# Patient Record
Sex: FEMALE | Race: WHITE | HISPANIC OR LATINO | Employment: PART TIME
[De-identification: names, ages, dates, MRNs, and addresses within clinical notes are randomized per-mention and may not be internally consistent; named-entity substitution may affect disease eponyms.]

---

## 2017-06-03 ENCOUNTER — HOSPITAL (OUTPATIENT)
Dept: OTHER | Age: 22
End: 2017-06-03
Attending: EMERGENCY MEDICINE

## 2019-02-01 ENCOUNTER — HOSPITAL (OUTPATIENT)
Dept: OTHER | Age: 24
End: 2019-02-01
Attending: EMERGENCY MEDICINE

## 2019-02-01 ENCOUNTER — DIAGNOSTIC TRANS (OUTPATIENT)
Dept: OTHER | Age: 24
End: 2019-02-01

## 2019-04-23 ENCOUNTER — HOSPITAL (OUTPATIENT)
Dept: OTHER | Age: 24
End: 2019-04-23
Attending: GENERAL PRACTICE

## 2019-05-07 ENCOUNTER — HOSPITAL (OUTPATIENT)
Dept: OTHER | Age: 24
End: 2019-05-07
Attending: GENERAL PRACTICE

## 2019-05-27 ENCOUNTER — HOSPITAL (OUTPATIENT)
Dept: OTHER | Age: 24
End: 2019-05-27

## 2019-05-27 PROCEDURE — 99283 EMERGENCY DEPT VISIT LOW MDM: CPT | Performed by: EMERGENCY MEDICINE

## 2019-08-15 ENCOUNTER — APPOINTMENT (OUTPATIENT)
Dept: CT IMAGING | Facility: HOSPITAL | Age: 24
End: 2019-08-15
Attending: EMERGENCY MEDICINE
Payer: MEDICAID

## 2019-08-15 ENCOUNTER — HOSPITAL ENCOUNTER (EMERGENCY)
Facility: HOSPITAL | Age: 24
Discharge: HOME | End: 2019-08-15
Attending: EMERGENCY MEDICINE
Payer: MEDICAID

## 2019-08-15 VITALS
TEMPERATURE: 98 F | BODY MASS INDEX: 19.29 KG/M2 | SYSTOLIC BLOOD PRESSURE: 99 MMHG | DIASTOLIC BLOOD PRESSURE: 65 MMHG | HEIGHT: 66 IN | HEART RATE: 56 BPM | WEIGHT: 120 LBS | RESPIRATION RATE: 20 BRPM | OXYGEN SATURATION: 99 %

## 2019-08-15 DIAGNOSIS — R10.9 ABDOMINAL PAIN, ACUTE: Primary | ICD-10-CM

## 2019-08-15 DIAGNOSIS — R11.2 NON-INTRACTABLE VOMITING WITH NAUSEA, UNSPECIFIED VOMITING TYPE: ICD-10-CM

## 2019-08-15 LAB
ANION GAP SERPL CALC-SCNC: 10 MMOL/L (ref 0–18)
B-HCG UR QL: NEGATIVE
BASOPHILS # BLD AUTO: 0.02 X10(3) UL (ref 0–0.2)
BASOPHILS NFR BLD AUTO: 0.2 %
BILIRUB UR QL: NEGATIVE
BUN BLD-MCNC: 17 MG/DL (ref 7–18)
BUN/CREAT SERPL: 22.7 (ref 10–20)
CALCIUM BLD-MCNC: 8.5 MG/DL (ref 8.5–10.1)
CHLORIDE SERPL-SCNC: 109 MMOL/L (ref 98–112)
CLARITY UR: CLEAR
CO2 SERPL-SCNC: 22 MMOL/L (ref 21–32)
COLOR UR: YELLOW
CREAT BLD-MCNC: 0.75 MG/DL (ref 0.55–1.02)
DEPRECATED RDW RBC AUTO: 41.4 FL (ref 35.1–46.3)
EOSINOPHIL # BLD AUTO: 0.08 X10(3) UL (ref 0–0.7)
EOSINOPHIL NFR BLD AUTO: 0.9 %
ERYTHROCYTE [DISTWIDTH] IN BLOOD BY AUTOMATED COUNT: 12.6 % (ref 11–15)
GLUCOSE BLD-MCNC: 87 MG/DL (ref 70–99)
GLUCOSE UR-MCNC: NEGATIVE MG/DL
HCT VFR BLD AUTO: 40.3 % (ref 35–48)
HGB BLD-MCNC: 13.4 G/DL (ref 12–16)
HGB UR QL STRIP.AUTO: NEGATIVE
IMM GRANULOCYTES # BLD AUTO: 0.02 X10(3) UL (ref 0–1)
IMM GRANULOCYTES NFR BLD: 0.2 %
KETONES UR-MCNC: NEGATIVE MG/DL
LYMPHOCYTES # BLD AUTO: 1.42 X10(3) UL (ref 1–4)
LYMPHOCYTES NFR BLD AUTO: 16.3 %
MCH RBC QN AUTO: 29.7 PG (ref 26–34)
MCHC RBC AUTO-ENTMCNC: 33.3 G/DL (ref 31–37)
MCV RBC AUTO: 89.4 FL (ref 80–100)
MONOCYTES # BLD AUTO: 0.64 X10(3) UL (ref 0.1–1)
MONOCYTES NFR BLD AUTO: 7.3 %
NEUTROPHILS # BLD AUTO: 6.53 X10 (3) UL (ref 1.5–7.7)
NEUTROPHILS # BLD AUTO: 6.53 X10(3) UL (ref 1.5–7.7)
NEUTROPHILS NFR BLD AUTO: 75.1 %
NITRITE UR QL STRIP.AUTO: NEGATIVE
OSMOLALITY SERPL CALC.SUM OF ELEC: 293 MOSM/KG (ref 275–295)
PH UR: 6 [PH] (ref 5–8)
PLATELET # BLD AUTO: 204 10(3)UL (ref 150–450)
POTASSIUM SERPL-SCNC: 3.5 MMOL/L (ref 3.5–5.1)
PROT UR-MCNC: NEGATIVE MG/DL
RBC # BLD AUTO: 4.51 X10(6)UL (ref 3.8–5.3)
RBC #/AREA URNS AUTO: 1 /HPF
SODIUM SERPL-SCNC: 141 MMOL/L (ref 136–145)
SP GR UR STRIP: 1.01 (ref 1–1.03)
UROBILINOGEN UR STRIP-ACNC: <2
VIT C UR-MCNC: NEGATIVE MG/DL
WBC # BLD AUTO: 8.7 X10(3) UL (ref 4–11)
WBC #/AREA URNS AUTO: 4 /HPF

## 2019-08-15 PROCEDURE — 74177 CT ABD & PELVIS W/CONTRAST: CPT | Performed by: EMERGENCY MEDICINE

## 2019-08-15 PROCEDURE — 96374 THER/PROPH/DIAG INJ IV PUSH: CPT

## 2019-08-15 PROCEDURE — 81025 URINE PREGNANCY TEST: CPT

## 2019-08-15 PROCEDURE — 96375 TX/PRO/DX INJ NEW DRUG ADDON: CPT

## 2019-08-15 PROCEDURE — 80048 BASIC METABOLIC PNL TOTAL CA: CPT | Performed by: EMERGENCY MEDICINE

## 2019-08-15 PROCEDURE — 99285 EMERGENCY DEPT VISIT HI MDM: CPT

## 2019-08-15 PROCEDURE — 81001 URINALYSIS AUTO W/SCOPE: CPT | Performed by: EMERGENCY MEDICINE

## 2019-08-15 PROCEDURE — 85025 COMPLETE CBC W/AUTO DIFF WBC: CPT | Performed by: EMERGENCY MEDICINE

## 2019-08-15 RX ORDER — ONDANSETRON 2 MG/ML
4 INJECTION INTRAMUSCULAR; INTRAVENOUS ONCE
Status: COMPLETED | OUTPATIENT
Start: 2019-08-15 | End: 2019-08-15

## 2019-08-15 RX ORDER — MORPHINE SULFATE 4 MG/ML
4 INJECTION, SOLUTION INTRAMUSCULAR; INTRAVENOUS ONCE
Status: COMPLETED | OUTPATIENT
Start: 2019-08-15 | End: 2019-08-15

## 2019-08-15 NOTE — ED INITIAL ASSESSMENT (HPI)
C/o epigastric abd pain that started 20 minutes pta. States \"it feels like a ball\". +nausea. Denies emesis or diarrhea. No medications taken pta.

## 2019-08-15 NOTE — ED NOTES
Assumed care from primary RN Esau Go. Patient resting in bed, call light at reach. No complaints at this time. Will continue to monitor.

## 2019-08-15 NOTE — ED NOTES
Patient alert, ambulatory in room, states she wants to go home. MD made aware and is talking with patient.

## 2019-08-15 NOTE — ED PROVIDER NOTES
Patient Seen in: [facility] AND [facility] Emergency Department    History   Patient presents with:  Abdomen/Flank Pain (GI/)    Stated Complaint: abd pain    HPI    Patient is a 22-year-old female who presents to the emergency department with chief complaint no pulsatile liver, no fluid wave, no abdominal bruit, no ascites, no pulsatile midline mass and no mass. There is tenderness in the left lower quadrant.  There is no rigidity, no rebound, no guarding, no CVA tenderness, no tenderness at McBurney's point an appointment as soon as possible for a visit          Medications Prescribed:  There are no discharge medications for this patient.

## 2019-08-15 NOTE — ED NOTES
Patient arrived states she woke up a few hours ago with stomach pain. Patient denies vomiting and diarrhea. Describes pain as pressure, \"like a ball sitting on stomach\".

## 2019-10-15 ENCOUNTER — APPOINTMENT (OUTPATIENT)
Dept: CT IMAGING | Facility: HOSPITAL | Age: 24
End: 2019-10-15
Attending: PHYSICIAN ASSISTANT
Payer: MEDICAID

## 2019-10-15 ENCOUNTER — HOSPITAL ENCOUNTER (EMERGENCY)
Facility: HOSPITAL | Age: 24
Discharge: HOME | End: 2019-10-15
Attending: PHYSICIAN ASSISTANT
Payer: MEDICAID

## 2019-10-15 ENCOUNTER — APPOINTMENT (OUTPATIENT)
Dept: ULTRASOUND IMAGING | Facility: HOSPITAL | Age: 24
End: 2019-10-15
Attending: PHYSICIAN ASSISTANT
Payer: MEDICAID

## 2019-10-15 VITALS
HEART RATE: 77 BPM | HEIGHT: 66 IN | OXYGEN SATURATION: 99 % | BODY MASS INDEX: 19.29 KG/M2 | DIASTOLIC BLOOD PRESSURE: 66 MMHG | SYSTOLIC BLOOD PRESSURE: 106 MMHG | TEMPERATURE: 98 F | RESPIRATION RATE: 18 BRPM | WEIGHT: 120 LBS

## 2019-10-15 DIAGNOSIS — R11.2 NAUSEA AND VOMITING IN ADULT: Primary | ICD-10-CM

## 2019-10-15 DIAGNOSIS — N83.202 LEFT OVARIAN CYST: ICD-10-CM

## 2019-10-15 PROCEDURE — 81001 URINALYSIS AUTO W/SCOPE: CPT | Performed by: PHYSICIAN ASSISTANT

## 2019-10-15 PROCEDURE — 99285 EMERGENCY DEPT VISIT HI MDM: CPT

## 2019-10-15 PROCEDURE — 93975 VASCULAR STUDY: CPT | Performed by: PHYSICIAN ASSISTANT

## 2019-10-15 PROCEDURE — 85025 COMPLETE CBC W/AUTO DIFF WBC: CPT | Performed by: PHYSICIAN ASSISTANT

## 2019-10-15 PROCEDURE — 74177 CT ABD & PELVIS W/CONTRAST: CPT | Performed by: PHYSICIAN ASSISTANT

## 2019-10-15 PROCEDURE — 81025 URINE PREGNANCY TEST: CPT | Performed by: PHYSICIAN ASSISTANT

## 2019-10-15 PROCEDURE — 76830 TRANSVAGINAL US NON-OB: CPT | Performed by: PHYSICIAN ASSISTANT

## 2019-10-15 PROCEDURE — 96375 TX/PRO/DX INJ NEW DRUG ADDON: CPT

## 2019-10-15 PROCEDURE — 76856 US EXAM PELVIC COMPLETE: CPT | Performed by: PHYSICIAN ASSISTANT

## 2019-10-15 PROCEDURE — 80053 COMPREHEN METABOLIC PANEL: CPT | Performed by: PHYSICIAN ASSISTANT

## 2019-10-15 PROCEDURE — 96374 THER/PROPH/DIAG INJ IV PUSH: CPT

## 2019-10-15 PROCEDURE — 83690 ASSAY OF LIPASE: CPT | Performed by: PHYSICIAN ASSISTANT

## 2019-10-15 PROCEDURE — 96361 HYDRATE IV INFUSION ADD-ON: CPT

## 2019-10-15 RX ORDER — IBUPROFEN 600 MG/1
TABLET ORAL
Qty: 20 TABLET | Refills: 0 | Status: SHIPPED | OUTPATIENT
Start: 2019-10-15

## 2019-10-15 RX ORDER — ONDANSETRON 4 MG/1
4 TABLET, ORALLY DISINTEGRATING ORAL EVERY 6 HOURS PRN
Qty: 10 TABLET | Refills: 0 | Status: SHIPPED | OUTPATIENT
Start: 2019-10-15 | End: 2019-10-18

## 2019-10-15 RX ORDER — ONDANSETRON 2 MG/ML
4 INJECTION INTRAMUSCULAR; INTRAVENOUS ONCE
Status: COMPLETED | OUTPATIENT
Start: 2019-10-15 | End: 2019-10-15

## 2019-10-15 RX ORDER — MORPHINE SULFATE 4 MG/ML
2 INJECTION, SOLUTION INTRAMUSCULAR; INTRAVENOUS ONCE
Status: COMPLETED | OUTPATIENT
Start: 2019-10-15 | End: 2019-10-15

## 2019-10-15 NOTE — ED PROVIDER NOTES
Patient Seen in: [facility] Emergency Department    History   Patient presents with:  Abdomen/Flank Pain (GI/)    Stated Complaint:     ZA Costa is a 27year old female who presents with chief complaint of periumbilical abdominal scooter and well-nourished. Moderate discomfort. Psychological: Alert, No abnormalities of mood, affect. Head: Normocephalic/atraumatic. Eyes: Pupils are equal round reactive to light. Conjunctiva are within normal limits. ENT: Oropharynx is clear.   Mucous m URINE - Normal   CBC WITH DIFFERENTIAL WITH PLATELET    Narrative: The following orders were created for panel order CBC WITH DIFFERENTIAL WITH PLATELET.   Procedure                               Abnormality         Status                     --------- Les Rojo.    Disposition and Plan     Clinical Impression:  Nausea and vomiting in adult  (primary encounter diagnosis)  Left ovarian cyst    Disposition:  Discharge    Follow-up:  Your primary care physician, your obstetrician    Schedule an appointment as s

## 2019-10-25 ENCOUNTER — OFFICE VISIT (OUTPATIENT)
Dept: OBGYN CLINIC | Facility: CLINIC | Age: 24
End: 2019-10-25
Payer: MEDICAID

## 2019-10-25 VITALS
DIASTOLIC BLOOD PRESSURE: 74 MMHG | SYSTOLIC BLOOD PRESSURE: 102 MMHG | BODY MASS INDEX: 19.29 KG/M2 | WEIGHT: 120 LBS | HEIGHT: 66 IN

## 2019-10-25 DIAGNOSIS — R10.31 ABDOMINAL PAIN, RIGHT LOWER QUADRANT: ICD-10-CM

## 2019-10-25 DIAGNOSIS — N83.209 CYST OF OVARY, UNSPECIFIED LATERALITY: ICD-10-CM

## 2019-10-25 DIAGNOSIS — R10.2 PELVIC PAIN: Primary | ICD-10-CM

## 2019-10-25 PROCEDURE — 99204 OFFICE O/P NEW MOD 45 MIN: CPT | Performed by: OBSTETRICS & GYNECOLOGY

## 2019-10-26 NOTE — PROGRESS NOTES
HPI:   Ariella Shannon is a 27year old female who presents for a hx of pelvic pain and was seen in the [facility] er, reveiwed pt's ultrasound and ct results,all questions answered. Pt's ovarian cyst, benign per u/s, noted. Pt counseled. No torsion noted.    Pt st suspicious lesions  HEENT: atraumatic, normocephalic  EYES:normal in appearance  NECK: supple,no adenopathy  CHEST: no chest tenderness  LUNGS: clear to auscultation  CARDIO: RRR without murmur  GI: good BS's,no masses, HSM or tenderness  :introitus is n

## 2019-10-31 PROBLEM — R10.2 PELVIC PAIN: Status: ACTIVE | Noted: 2019-10-31

## 2019-10-31 PROBLEM — N83.209 CYST OF OVARY: Status: ACTIVE | Noted: 2019-10-31

## 2019-12-21 ENCOUNTER — HOSPITAL (OUTPATIENT)
Dept: OTHER | Age: 24
End: 2019-12-21

## 2019-12-21 LAB
ALBUMIN SERPL-MCNC: 4 G/DL (ref 3.6–5.1)
ALBUMIN/GLOB SERPL: 1 {RATIO} (ref 1–2.4)
ALP SERPL-CCNC: 58 UNITS/L (ref 45–117)
ALT SERPL-CCNC: 20 UNITS/L
ANALYZER ANC (IANC): ABNORMAL
ANION GAP SERPL CALC-SCNC: 8 MMOL/L (ref 10–20)
AST SERPL-CCNC: 18 UNITS/L
BASOPHILS # BLD: 0 K/MCL (ref 0–0.3)
BASOPHILS NFR BLD: 0 %
BILIRUB SERPL-MCNC: 0.3 MG/DL (ref 0.2–1)
BUN SERPL-MCNC: 15 MG/DL (ref 6–20)
BUN/CREAT SERPL: 28 (ref 7–25)
CALCIUM SERPL-MCNC: 8.3 MG/DL (ref 8.4–10.2)
CHLORIDE SERPL-SCNC: 109 MMOL/L (ref 98–107)
CO2 SERPL-SCNC: 25 MMOL/L (ref 21–32)
CREAT SERPL-MCNC: 0.53 MG/DL (ref 0.51–0.95)
DIFFERENTIAL METHOD BLD: ABNORMAL
EOSINOPHIL # BLD: 0 K/MCL (ref 0.1–0.5)
EOSINOPHIL NFR BLD: 0 %
ERYTHROCYTE [DISTWIDTH] IN BLOOD: 12.6 % (ref 11–15)
GLOBULIN SER-MCNC: 4.1 G/DL (ref 2–4)
GLUCOSE SERPL-MCNC: 85 MG/DL (ref 65–99)
HCG SERPL QL: NEGATIVE
HCT VFR BLD CALC: 38.9 % (ref 36–46.5)
HGB BLD-MCNC: 12.6 G/DL (ref 12–15.5)
IMM GRANULOCYTES # BLD AUTO: 0 K/MCL (ref 0–0.2)
IMM GRANULOCYTES NFR BLD: 0 %
LIPASE SERPL-CCNC: 113 UNITS/L (ref 73–393)
LYMPHOCYTES # BLD: 1.6 K/MCL (ref 1–4.8)
LYMPHOCYTES NFR BLD: 25 %
MCH RBC QN AUTO: 29.2 PG (ref 26–34)
MCHC RBC AUTO-ENTMCNC: 32.4 G/DL (ref 32–36.5)
MCV RBC AUTO: 90 FL (ref 78–100)
MONOCYTES # BLD: 0.4 K/MCL (ref 0.3–0.9)
MONOCYTES NFR BLD: 6 %
NEUTROPHILS # BLD: 4.4 K/MCL (ref 1.8–7.7)
NEUTROPHILS NFR BLD: 69 %
NEUTS SEG NFR BLD: ABNORMAL %
NRBC (NRBCRE): 0 /100 WBC
PLATELET # BLD: 208 K/MCL (ref 140–450)
POTASSIUM SERPL-SCNC: 4 MMOL/L (ref 3.4–5.1)
PROT SERPL-MCNC: 8.1 G/DL (ref 6.4–8.2)
RBC # BLD: 4.32 MIL/MCL (ref 4–5.2)
SODIUM SERPL-SCNC: 138 MMOL/L (ref 135–145)
WBC # BLD: 6.3 K/MCL (ref 4.2–11)

## 2019-12-21 PROCEDURE — 99285 EMERGENCY DEPT VISIT HI MDM: CPT | Performed by: EMERGENCY MEDICINE

## 2019-12-22 LAB
AMORPH SED URNS QL MICRO: ABNORMAL
APPEARANCE UR: ABNORMAL
BACTERIA #/AREA URNS HPF: ABNORMAL /HPF
BILIRUB UR QL: NEGATIVE
CAOX CRY URNS QL MICRO: ABNORMAL
COLOR UR: YELLOW
EPITH CASTS #/AREA URNS LPF: ABNORMAL /[LPF]
FATTY CASTS #/AREA URNS LPF: ABNORMAL /[LPF]
GLUCOSE UR-MCNC: NEGATIVE MG/DL
GRAN CASTS #/AREA URNS LPF: ABNORMAL /[LPF]
HGB UR QL: NEGATIVE
HYALINE CASTS #/AREA URNS LPF: ABNORMAL /LPF (ref 0–5)
KETONES UR-MCNC: 20 MG/DL
LEUKOCYTE ESTERASE UR QL STRIP: ABNORMAL
LEUKOCYTE ESTERASE UR QL STRIP: ABNORMAL
MIXED CELL CASTS #/AREA URNS LPF: ABNORMAL /[LPF]
MUCOUS THREADS URNS QL MICRO: PRESENT
NITRITE UR QL: NEGATIVE
PH UR: 5 UNITS (ref 5–7)
PROT UR QL: NEGATIVE MG/DL
RBC #/AREA URNS HPF: ABNORMAL /HPF (ref 0–2)
RBC CASTS #/AREA URNS LPF: ABNORMAL /[LPF]
RENAL EPI CELLS #/AREA URNS HPF: ABNORMAL /[HPF]
SP GR UR: 1.03 (ref 1–1.03)
SPECIMEN SOURCE: ABNORMAL
SPERM URNS QL MICRO: ABNORMAL
SQUAMOUS #/AREA URNS HPF: ABNORMAL /HPF (ref 0–5)
T VAGINALIS URNS QL MICRO: ABNORMAL
TRI-PHOS CRY URNS QL MICRO: ABNORMAL
URATE CRY URNS QL MICRO: ABNORMAL
URINE REFLEX: ABNORMAL
URNS CMNT MICRO: ABNORMAL
UROBILINOGEN UR QL: 0.2 MG/DL (ref 0–1)
WAXY CASTS #/AREA URNS LPF: ABNORMAL /[LPF]
WBC #/AREA URNS HPF: ABNORMAL /HPF (ref 0–5)
WBC CASTS #/AREA URNS LPF: ABNORMAL /[LPF]
YEAST HYPHAE URNS QL MICRO: ABNORMAL
YEAST URNS QL MICRO: ABNORMAL

## 2019-12-23 LAB
BACTERIA UR CULT: NORMAL

## 2020-01-13 ENCOUNTER — OFFICE VISIT (OUTPATIENT)
Dept: OBGYN CLINIC | Facility: CLINIC | Age: 25
End: 2020-01-13
Payer: MEDICAID

## 2020-01-13 VITALS — WEIGHT: 119 LBS | SYSTOLIC BLOOD PRESSURE: 110 MMHG | DIASTOLIC BLOOD PRESSURE: 60 MMHG | BODY MASS INDEX: 19 KG/M2

## 2020-01-13 DIAGNOSIS — Z01.419 ENCOUNTER FOR WELL WOMAN EXAM WITH ROUTINE GYNECOLOGICAL EXAM: Primary | ICD-10-CM

## 2020-01-13 DIAGNOSIS — Z11.3 SCREENING FOR STD (SEXUALLY TRANSMITTED DISEASE): ICD-10-CM

## 2020-01-13 PROCEDURE — 99395 PREV VISIT EST AGE 18-39: CPT | Performed by: ADVANCED PRACTICE MIDWIFE

## 2020-01-13 RX ORDER — AMOXICILLIN 875 MG/1
TABLET, COATED ORAL
COMMUNITY
Start: 2020-01-10

## 2020-01-13 RX ORDER — ALBUTEROL SULFATE 90 UG/1
2 AEROSOL, METERED RESPIRATORY (INHALATION) EVERY 4 HOURS PRN
Qty: 1 INHALER | Refills: 2 | Status: SHIPPED | OUTPATIENT
Start: 2020-01-13

## 2020-01-13 NOTE — PROGRESS NOTES
HPI:    Patient ID: Van Chaney is a 27year old female. Here for annual exam.  Is employed in [employer]. Currently has a URI. Is not sexually active at this time.   Has an ear infection is on an antiibotics    [facility] reviewed and updated    12 pt RO Dispense Refill   • amoxicillin 875 MG Oral Tab TK 1 T PO Q 12 H FOR 7 DAYS     • CIPRODEX 0.3-0.1 % Otic Suspension INT 4 DROPS INTO THE AFFECTED EAR TWICE A DAY     • Albuterol Sulfate  (90 Base) MCG/ACT Inhalation Aero Soln Inhale 2 puffs into th vaginal discharge. Musculoskeletal: Normal range of motion. General: No tenderness or edema. Lymphadenopathy:     She has no cervical adenopathy. Right: No inguinal adenopathy present. Left: No inguinal adenopathy present.    [place]

## 2020-01-14 LAB
C TRACH DNA SPEC QL NAA+PROBE: NEGATIVE
N GONORRHOEA DNA SPEC QL NAA+PROBE: NEGATIVE

## 2020-01-15 ENCOUNTER — TELEPHONE (OUTPATIENT)
Dept: OBGYN CLINIC | Facility: CLINIC | Age: 25
End: 2020-01-15

## 2020-01-15 NOTE — TELEPHONE ENCOUNTER
Pt Name and  verified. Patient informed and verbalized understanding.        Viewed by Ovidio Abebe on 1/15/2020  9:32 AM   Written by ALANIS Nogueira on 1/15/2020  9:25 AM   Normal pap repeat in 3 years   ----       Viewed by Ovidio Abebe on

## 2020-02-17 ENCOUNTER — OFFICE VISIT (OUTPATIENT)
Dept: OBGYN CLINIC | Facility: CLINIC | Age: 25
End: 2020-02-17
Payer: MEDICAID

## 2020-02-17 ENCOUNTER — APPOINTMENT (OUTPATIENT)
Dept: LAB | Facility: HOSPITAL | Age: 25
End: 2020-02-17
Attending: ADVANCED PRACTICE MIDWIFE
Payer: MEDICAID

## 2020-02-17 VITALS
BODY MASS INDEX: 19 KG/M2 | SYSTOLIC BLOOD PRESSURE: 105 MMHG | DIASTOLIC BLOOD PRESSURE: 69 MMHG | HEART RATE: 64 BPM | WEIGHT: 118.81 LBS

## 2020-02-17 DIAGNOSIS — N92.6 MISSED MENSES: ICD-10-CM

## 2020-02-17 DIAGNOSIS — N92.6 MISSED MENSES: Primary | ICD-10-CM

## 2020-02-17 LAB — B-HCG SERPL-ACNC: <1 MIU/ML

## 2020-02-17 PROCEDURE — 36415 COLL VENOUS BLD VENIPUNCTURE: CPT

## 2020-02-17 PROCEDURE — 99213 OFFICE O/P EST LOW 20 MIN: CPT | Performed by: ADVANCED PRACTICE MIDWIFE

## 2020-02-17 PROCEDURE — 84702 CHORIONIC GONADOTROPIN TEST: CPT

## 2020-02-17 NOTE — PROGRESS NOTES
HPI:    Patient ID: Chip Lundberg is a 27year old female. LMP 12/23/19 until 12/26/19. Has had occasions of irregular cycles in the past x 2. Is sexually active with a new partner. I using condoms every time.   Can not remember a time of disruption i requested or ordered in this encounter       Imaging & Referrals:  None       #[id number]

## 2020-03-14 ENCOUNTER — HOSPITAL ENCOUNTER (EMERGENCY)
Facility: HOSPITAL | Age: 25
Discharge: HOME | End: 2020-03-14
Attending: EMERGENCY MEDICINE
Payer: MEDICAID

## 2020-03-14 ENCOUNTER — APPOINTMENT (OUTPATIENT)
Dept: CT IMAGING | Facility: HOSPITAL | Age: 25
End: 2020-03-14
Attending: EMERGENCY MEDICINE
Payer: MEDICAID

## 2020-03-14 VITALS
RESPIRATION RATE: 18 BRPM | SYSTOLIC BLOOD PRESSURE: 104 MMHG | HEART RATE: 65 BPM | OXYGEN SATURATION: 96 % | WEIGHT: 125 LBS | HEIGHT: 64 IN | DIASTOLIC BLOOD PRESSURE: 62 MMHG | BODY MASS INDEX: 21.34 KG/M2 | TEMPERATURE: 97 F

## 2020-03-14 DIAGNOSIS — R10.9 ABDOMINAL PAIN OF UNKNOWN ETIOLOGY: Primary | ICD-10-CM

## 2020-03-14 LAB
ALBUMIN SERPL-MCNC: 3.9 G/DL (ref 3.4–5)
ALBUMIN/GLOB SERPL: 1 {RATIO} (ref 1–2)
ALP LIVER SERPL-CCNC: 56 U/L (ref 52–144)
ALT SERPL-CCNC: 20 U/L (ref 13–56)
ANION GAP SERPL CALC-SCNC: 5 MMOL/L (ref 0–18)
AST SERPL-CCNC: 13 U/L (ref 15–37)
BASOPHILS # BLD AUTO: 0.03 X10(3) UL (ref 0–0.2)
BASOPHILS NFR BLD AUTO: 0.5 %
BILIRUB SERPL-MCNC: 0.3 MG/DL (ref 0.1–2)
BILIRUB UR QL STRIP.AUTO: NEGATIVE
BUN BLD-MCNC: 12 MG/DL (ref 7–18)
BUN/CREAT SERPL: 17.4 (ref 10–20)
CALCIUM BLD-MCNC: 8.6 MG/DL (ref 8.5–10.1)
CHLORIDE SERPL-SCNC: 109 MMOL/L (ref 98–112)
CLARITY UR REFRACT.AUTO: CLEAR
CO2 SERPL-SCNC: 27 MMOL/L (ref 21–32)
CREAT BLD-MCNC: 0.69 MG/DL (ref 0.55–1.02)
DEPRECATED RDW RBC AUTO: 41 FL (ref 35.1–46.3)
EOSINOPHIL # BLD AUTO: 0.08 X10(3) UL (ref 0–0.7)
EOSINOPHIL NFR BLD AUTO: 1.3 %
ERYTHROCYTE [DISTWIDTH] IN BLOOD BY AUTOMATED COUNT: 12.5 % (ref 11–15)
GLOBULIN PLAS-MCNC: 4.1 G/DL (ref 2.8–4.4)
GLUCOSE BLD-MCNC: 101 MG/DL (ref 70–99)
GLUCOSE UR STRIP.AUTO-MCNC: NEGATIVE MG/DL
HCT VFR BLD AUTO: 37.6 % (ref 35–48)
HGB BLD-MCNC: 12.4 G/DL (ref 12–16)
IMM GRANULOCYTES # BLD AUTO: 0.01 X10(3) UL (ref 0–1)
IMM GRANULOCYTES NFR BLD: 0.2 %
KETONES UR STRIP.AUTO-MCNC: NEGATIVE MG/DL
LEUKOCYTE ESTERASE UR QL STRIP.AUTO: NEGATIVE
LIPASE SERPL-CCNC: 167 U/L (ref 73–393)
LYMPHOCYTES # BLD AUTO: 2.7 X10(3) UL (ref 1–4)
LYMPHOCYTES NFR BLD AUTO: 44.6 %
M PROTEIN MFR SERPL ELPH: 8 G/DL (ref 6.4–8.2)
MCH RBC QN AUTO: 29.5 PG (ref 26–34)
MCHC RBC AUTO-ENTMCNC: 33 G/DL (ref 31–37)
MCV RBC AUTO: 89.5 FL (ref 80–100)
MONOCYTES # BLD AUTO: 0.45 X10(3) UL (ref 0.1–1)
MONOCYTES NFR BLD AUTO: 7.4 %
NEUTROPHILS # BLD AUTO: 2.79 X10 (3) UL (ref 1.5–7.7)
NEUTROPHILS # BLD AUTO: 2.79 X10(3) UL (ref 1.5–7.7)
NEUTROPHILS NFR BLD AUTO: 46 %
NITRITE UR QL STRIP.AUTO: NEGATIVE
OSMOLALITY SERPL CALC.SUM OF ELEC: 292 MOSM/KG (ref 275–295)
PH UR STRIP.AUTO: 6 [PH] (ref 4.5–8)
PLATELET # BLD AUTO: 188 10(3)UL (ref 150–450)
POCT LOT NUMBER: NORMAL
POCT URINE PREGNANCY: NEGATIVE
POTASSIUM SERPL-SCNC: 3.6 MMOL/L (ref 3.5–5.1)
PROT UR STRIP.AUTO-MCNC: NEGATIVE MG/DL
RBC # BLD AUTO: 4.2 X10(6)UL (ref 3.8–5.3)
RBC UR QL AUTO: NEGATIVE
SODIUM SERPL-SCNC: 141 MMOL/L (ref 136–145)
SP GR UR STRIP.AUTO: 1 (ref 1–1.03)
UROBILINOGEN UR STRIP.AUTO-MCNC: <2 MG/DL
WBC # BLD AUTO: 6.1 X10(3) UL (ref 4–11)

## 2020-03-14 PROCEDURE — 96375 TX/PRO/DX INJ NEW DRUG ADDON: CPT

## 2020-03-14 PROCEDURE — 81003 URINALYSIS AUTO W/O SCOPE: CPT | Performed by: EMERGENCY MEDICINE

## 2020-03-14 PROCEDURE — 74176 CT ABD & PELVIS W/O CONTRAST: CPT | Performed by: EMERGENCY MEDICINE

## 2020-03-14 PROCEDURE — 99284 EMERGENCY DEPT VISIT MOD MDM: CPT

## 2020-03-14 PROCEDURE — 80053 COMPREHEN METABOLIC PANEL: CPT | Performed by: EMERGENCY MEDICINE

## 2020-03-14 PROCEDURE — 96374 THER/PROPH/DIAG INJ IV PUSH: CPT

## 2020-03-14 PROCEDURE — 83690 ASSAY OF LIPASE: CPT | Performed by: EMERGENCY MEDICINE

## 2020-03-14 PROCEDURE — 85025 COMPLETE CBC W/AUTO DIFF WBC: CPT | Performed by: EMERGENCY MEDICINE

## 2020-03-14 RX ORDER — ONDANSETRON 4 MG/1
4 TABLET, ORALLY DISINTEGRATING ORAL EVERY 4 HOURS PRN
Qty: 10 TABLET | Refills: 0 | Status: SHIPPED | OUTPATIENT
Start: 2020-03-14 | End: 2020-03-21

## 2020-03-14 RX ORDER — ONDANSETRON 2 MG/ML
4 INJECTION INTRAMUSCULAR; INTRAVENOUS ONCE
Status: COMPLETED | OUTPATIENT
Start: 2020-03-14 | End: 2020-03-14

## 2020-03-14 RX ORDER — KETOROLAC TROMETHAMINE 30 MG/ML
30 INJECTION, SOLUTION INTRAMUSCULAR; INTRAVENOUS ONCE
Status: COMPLETED | OUTPATIENT
Start: 2020-03-14 | End: 2020-03-14

## 2020-03-14 RX ORDER — DICYCLOMINE HCL 20 MG
20 TABLET ORAL 4 TIMES DAILY PRN
Qty: 30 TABLET | Refills: 0 | Status: SHIPPED | OUTPATIENT
Start: 2020-03-14 | End: 2020-04-13

## 2020-03-14 NOTE — ED PROVIDER NOTES
Patient Seen in: [facility] Emergency Department      History   Patient presents with:  Abdomen/Flank Pain    Stated Complaint: abdominal pain    HPI    82-year-old female with a history of asthma presents to the emergency department for complaints Course     Labs Reviewed   COMP METABOLIC PANEL (14) - Abnormal; Notable for the following components:       Result Value    Glucose 101 (*)     AST 13 (*)     All other components within normal limits   LIPASE - Normal   URINALYSIS WITH CULTURE REFLEX   C longer present. There was no indication for further evaluation, treatment or admission on an emergency basis. Comprehensive verbal and written discharge and follow-up instructions were provided to help prevent relapse or worsening.   Patient was instructe

## 2020-03-14 NOTE — ED NOTES
Patient discharged to home. Patient AOx3 with no signs of acute distress. Patient reports abdominal pain gone upon discharge.

## 2020-03-14 NOTE — ED INITIAL ASSESSMENT (HPI)
26 yo F complaining of LUQ pain, nausea x 30 minutes. Denies urinary symptoms. Last BM yesterday, denies constipation or diarrhea. LMP February 19, 2020.

## 2020-09-29 ENCOUNTER — HOSPITAL ENCOUNTER (EMERGENCY)
Age: 25
Discharge: HOME | End: 2020-09-29
Attending: EMERGENCY MEDICINE

## 2020-09-29 VITALS
HEART RATE: 88 BPM | RESPIRATION RATE: 18 BRPM | OXYGEN SATURATION: 99 % | TEMPERATURE: 98.2 F | DIASTOLIC BLOOD PRESSURE: 77 MMHG | WEIGHT: 123 LBS | SYSTOLIC BLOOD PRESSURE: 120 MMHG

## 2020-09-29 DIAGNOSIS — J32.9 RHINOSINUSITIS: Primary | ICD-10-CM

## 2020-09-29 PROCEDURE — 87635 SARS-COV-2 COVID-19 AMP PRB: CPT

## 2020-09-29 PROCEDURE — 99283 EMERGENCY DEPT VISIT LOW MDM: CPT | Performed by: EMERGENCY MEDICINE

## 2020-09-29 PROCEDURE — C9803 HOPD COVID-19 SPEC COLLECT: HCPCS

## 2020-09-29 PROCEDURE — 99283 EMERGENCY DEPT VISIT LOW MDM: CPT

## 2020-09-29 RX ORDER — OXYMETAZOLINE HYDROCHLORIDE 0.05 G/100ML
2 SPRAY NASAL 2 TIMES DAILY
Qty: 1 BOTTLE | Refills: 0 | Status: SHIPPED | OUTPATIENT
Start: 2020-09-29 | End: 2020-10-02

## 2020-09-29 RX ORDER — CETIRIZINE HYDROCHLORIDE, PSEUDOEPHEDRINE HYDROCHLORIDE 5; 120 MG/1; MG/1
1 TABLET, FILM COATED, EXTENDED RELEASE ORAL 2 TIMES DAILY PRN
Qty: 7 TABLET | Refills: 0 | Status: SHIPPED | OUTPATIENT
Start: 2020-09-29 | End: 2020-10-06

## 2020-09-30 LAB
SARS-COV-2 RNA RESP QL NAA+PROBE: NOT DETECTED
SERVICE CMNT-IMP: NORMAL
SPECIMEN SOURCE: NORMAL

## 2021-01-12 ENCOUNTER — HOSPITAL ENCOUNTER (EMERGENCY)
Age: 26
Discharge: HOME | End: 2021-01-12
Attending: EMERGENCY MEDICINE

## 2021-01-12 VITALS
BODY MASS INDEX: 21.08 KG/M2 | RESPIRATION RATE: 15 BRPM | DIASTOLIC BLOOD PRESSURE: 69 MMHG | HEIGHT: 64 IN | WEIGHT: 123.46 LBS | OXYGEN SATURATION: 100 % | HEART RATE: 70 BPM | TEMPERATURE: 97.7 F | SYSTOLIC BLOOD PRESSURE: 100 MMHG

## 2021-01-12 DIAGNOSIS — N94.6 DYSMENORRHEA: Primary | ICD-10-CM

## 2021-01-12 LAB
APPEARANCE UR: CLEAR
BILIRUB UR QL STRIP: NEGATIVE
COLOR UR: YELLOW
GLUCOSE UR STRIP-MCNC: NEGATIVE MG/DL
HCG UR QL: NEGATIVE
HGB UR QL STRIP: ABNORMAL
KETONES UR STRIP-MCNC: NEGATIVE MG/DL
LEUKOCYTE ESTERASE UR QL STRIP: NEGATIVE
NITRITE UR QL STRIP: NEGATIVE
PH UR STRIP: 5.5 UNITS (ref 5–7)
PROT UR STRIP-MCNC: NEGATIVE MG/DL
SP GR UR STRIP: >1.03 (ref 1–1.03)
UROBILINOGEN UR STRIP-MCNC: 0.2 MG/DL

## 2021-01-12 PROCEDURE — U0005 INFEC AGEN DETEC AMPLI PROBE: HCPCS | Performed by: EMERGENCY MEDICINE

## 2021-01-12 PROCEDURE — 84703 CHORIONIC GONADOTROPIN ASSAY: CPT

## 2021-01-12 PROCEDURE — 99283 EMERGENCY DEPT VISIT LOW MDM: CPT

## 2021-01-12 PROCEDURE — 99283 EMERGENCY DEPT VISIT LOW MDM: CPT | Performed by: EMERGENCY MEDICINE

## 2021-01-12 PROCEDURE — C9803 HOPD COVID-19 SPEC COLLECT: HCPCS

## 2021-01-12 PROCEDURE — X1094 NO CHARGE VISIT: HCPCS | Performed by: NURSE PRACTITIONER

## 2021-01-12 PROCEDURE — 81003 URINALYSIS AUTO W/O SCOPE: CPT

## 2021-01-12 ASSESSMENT — ENCOUNTER SYMPTOMS
NAUSEA: 1
CONSTIPATION: 0
SHORTNESS OF BREATH: 0
COLOR CHANGE: 0
ABDOMINAL DISTENTION: 0
ABDOMINAL PAIN: 1
CHEST TIGHTNESS: 0
VOMITING: 1
FATIGUE: 0
FEVER: 0
CHILLS: 0
AGITATION: 0
DIZZINESS: 0
EYE PAIN: 0
RHINORRHEA: 0
HEADACHES: 0
HALLUCINATIONS: 0
SINUS PRESSURE: 0
NERVOUS/ANXIOUS: 0
DIARRHEA: 0
SORE THROAT: 0
EYE REDNESS: 0
LIGHT-HEADEDNESS: 0
WEAKNESS: 0
ACTIVITY CHANGE: 0
COUGH: 0

## 2021-01-13 LAB
SARS-COV-2 RNA RESP QL NAA+PROBE: NOT DETECTED
SERVICE CMNT-IMP: NORMAL
SERVICE CMNT-IMP: NORMAL

## 2021-10-05 ENCOUNTER — HOSPITAL ENCOUNTER (OUTPATIENT)
Age: 26
Discharge: HOME | End: 2021-10-05
Payer: MEDICAID

## 2021-10-05 VITALS
DIASTOLIC BLOOD PRESSURE: 61 MMHG | SYSTOLIC BLOOD PRESSURE: 106 MMHG | HEART RATE: 68 BPM | OXYGEN SATURATION: 100 % | RESPIRATION RATE: 18 BRPM | TEMPERATURE: 98 F

## 2021-10-05 DIAGNOSIS — Z20.822 ENCOUNTER FOR LABORATORY TESTING FOR COVID-19 VIRUS: Primary | ICD-10-CM

## 2021-10-05 PROCEDURE — 99213 OFFICE O/P EST LOW 20 MIN: CPT

## 2021-10-05 PROCEDURE — 99212 OFFICE O/P EST SF 10 MIN: CPT

## 2021-10-05 RX ORDER — PNV NO.95/FERROUS FUM/FOLIC AC 28MG-0.8MG
1 TABLET ORAL DAILY
COMMUNITY
Start: 2021-10-04

## 2021-10-05 NOTE — ED PROVIDER NOTES
Patient Seen in: [facility]      History   Patient presents with:  Covid-19 Test    Stated Complaint: Rapid Covid test    Subjective:   Patient presents to the immediate care accompanied by self.   Patient states she is here for Covid testing, Device None (Room air)       Current:/61   Pulse 68   Temp 97.6 °F (36.4 °C) (Temporal)   Resp 18   LMP 10/04/2021   SpO2 100%         Physical Exam  Vitals and nursing note reviewed. Constitutional:       General: She is not in acute distress. exposure, URI      Patient is a 24-year-old female. Patient appears well-hydrated, nontoxic appearing. Patient has no past medical history. Patient presents to the immediate care accompanied by self.   Patient states she is here for Covid testing, her rodríguez

## 2021-10-05 NOTE — ED INITIAL ASSESSMENT (HPI)
Pt presents to the IC with c/o needing a random covid test required by her boss at work. Pt denies known exposure or symptoms.

## 2022-03-09 ENCOUNTER — OFFICE VISIT (OUTPATIENT)
Dept: OBGYN | Age: 27
End: 2022-03-09

## 2022-03-09 VITALS
OXYGEN SATURATION: 100 % | HEIGHT: 63 IN | WEIGHT: 132.8 LBS | DIASTOLIC BLOOD PRESSURE: 70 MMHG | BODY MASS INDEX: 23.53 KG/M2 | SYSTOLIC BLOOD PRESSURE: 103 MMHG | TEMPERATURE: 98.4 F | HEART RATE: 65 BPM

## 2022-03-09 DIAGNOSIS — Z71.9 ENCOUNTER FOR CONSULTATION: Primary | ICD-10-CM

## 2022-03-09 PROCEDURE — 99202 OFFICE O/P NEW SF 15 MIN: CPT | Performed by: OBSTETRICS & GYNECOLOGY

## 2022-03-09 RX ORDER — IBUPROFEN 600 MG/1
600 TABLET ORAL
COMMUNITY
Start: 2021-10-24 | End: 2022-09-19 | Stop reason: ALTCHOICE

## 2022-03-09 RX ORDER — PNV NO.95/FERROUS FUM/FOLIC AC 28MG-0.8MG
1 TABLET ORAL DAILY
COMMUNITY
Start: 2021-12-20 | End: 2022-09-19 | Stop reason: ALTCHOICE

## 2022-03-09 ASSESSMENT — PATIENT HEALTH QUESTIONNAIRE - PHQ9
CLINICAL INTERPRETATION OF PHQ2 SCORE: NO FURTHER SCREENING NEEDED
1. LITTLE INTEREST OR PLEASURE IN DOING THINGS: NOT AT ALL
SUM OF ALL RESPONSES TO PHQ9 QUESTIONS 1 AND 2: 0
SUM OF ALL RESPONSES TO PHQ9 QUESTIONS 1 AND 2: 0
2. FEELING DOWN, DEPRESSED OR HOPELESS: NOT AT ALL

## 2022-03-09 ASSESSMENT — PAIN SCALES - GENERAL: PAINLEVEL: 0

## 2022-04-19 ENCOUNTER — TELEPHONIC VISIT (OUTPATIENT)
Dept: OBGYN | Age: 27
End: 2022-04-19

## 2022-04-19 VITALS — HEIGHT: 64 IN | BODY MASS INDEX: 22.8 KG/M2

## 2022-04-19 DIAGNOSIS — Z3A.01 LESS THAN 8 WEEKS GESTATION OF PREGNANCY: Primary | ICD-10-CM

## 2022-04-19 PROCEDURE — X1094 NO CHARGE VISIT: HCPCS | Performed by: OBSTETRICS & GYNECOLOGY

## 2022-04-19 ASSESSMENT — LIFESTYLE VARIABLES
DID ANY OF YOUR PARENTS HAVE PROBLEMS WITH ALCOHOL OR DRUG USE?: NO
BEFORE YOU WERE PREGNANT DID YOU HAVE A PROBLEM WITH ALCOHOL OR DRUG USE? (PAST): NO
IN THE PAST MONTH, DID YOU DRINK BEER, WINE OR LIQUOR, OR USE OTHER DRUGS? (PREGNANCY): NO
DOES YOUR PARTNER HAVE A PROBLEM WITH ALCOHOL OR DRUG USE?: NO
DO ANY OF YOUR FRIENDS (PEERS) HAVE PROBLEMS WITH ALCOHOL OR DRUG USE?: NO

## 2022-04-26 ENCOUNTER — FIRST OB VISIT (OUTPATIENT)
Dept: OBGYN | Age: 27
End: 2022-04-26

## 2022-04-26 VITALS
WEIGHT: 136.69 LBS | SYSTOLIC BLOOD PRESSURE: 106 MMHG | HEART RATE: 96 BPM | DIASTOLIC BLOOD PRESSURE: 64 MMHG | TEMPERATURE: 98 F | BODY MASS INDEX: 24.22 KG/M2 | HEIGHT: 63 IN | OXYGEN SATURATION: 100 %

## 2022-04-26 DIAGNOSIS — Z3A.08 8 WEEKS GESTATION OF PREGNANCY: Primary | ICD-10-CM

## 2022-04-26 PROCEDURE — 0500F INITIAL PRENATAL CARE VISIT: CPT | Performed by: OBSTETRICS & GYNECOLOGY

## 2022-04-26 PROCEDURE — 80081 OBSTETRIC PANEL INC HIV TSTG: CPT | Performed by: INTERNAL MEDICINE

## 2022-04-26 PROCEDURE — 86787 VARICELLA-ZOSTER ANTIBODY: CPT | Performed by: INTERNAL MEDICINE

## 2022-04-26 PROCEDURE — 87086 URINE CULTURE/COLONY COUNT: CPT | Performed by: INTERNAL MEDICINE

## 2022-04-26 PROCEDURE — 99000 SPECIMEN HANDLING OFFICE-LAB: CPT | Performed by: OBSTETRICS & GYNECOLOGY

## 2022-04-26 PROCEDURE — 36415 COLL VENOUS BLD VENIPUNCTURE: CPT | Performed by: OBSTETRICS & GYNECOLOGY

## 2022-04-26 PROCEDURE — 86803 HEPATITIS C AB TEST: CPT | Performed by: INTERNAL MEDICINE

## 2022-04-26 RX ORDER — DOXYLAMINE SUCCINATE AND PYRIDOXINE HYDROCHLORIDE, DELAYED RELEASE TABLETS 10 MG/10 MG 10; 10 MG/1; MG/1
1 TABLET, DELAYED RELEASE ORAL EVERY 6 HOURS PRN
Qty: 60 TABLET | Refills: 3 | Status: SHIPPED | OUTPATIENT
Start: 2022-04-26 | End: 2022-09-19 | Stop reason: ALTCHOICE

## 2022-04-26 RX ORDER — PNV NO.95/FERROUS FUM/FOLIC AC 28MG-0.8MG
1 TABLET ORAL DAILY
Qty: 60 TABLET | Refills: 3 | Status: ON HOLD | OUTPATIENT
Start: 2022-04-26 | End: 2022-12-04 | Stop reason: HOSPADM

## 2022-04-27 LAB
ABO + RH BLD: NORMAL
BASOPHILS # BLD: 0 K/MCL (ref 0–0.3)
BASOPHILS NFR BLD: 0 %
BLD GP AB SCN SERPL QL GEL: NEGATIVE
DEPRECATED RDW RBC: 43.4 FL (ref 39–50)
EOSINOPHIL # BLD: 0.1 K/MCL (ref 0–0.5)
EOSINOPHIL NFR BLD: 1 %
ERYTHROCYTE [DISTWIDTH] IN BLOOD: 12.7 % (ref 11–15)
HBV SURFACE AG SER QL: NEGATIVE
HCT VFR BLD CALC: 37.6 % (ref 36–46.5)
HCV AB SER QL: NEGATIVE
HGB BLD-MCNC: 12.5 G/DL (ref 12–15.5)
HIV 1+2 AB+HIV1 P24 AG SERPL QL IA: NONREACTIVE
IMM GRANULOCYTES # BLD AUTO: 0 K/MCL (ref 0–0.2)
IMM GRANULOCYTES # BLD: 0 %
LYMPHOCYTES # BLD: 2.4 K/MCL (ref 1–4.8)
LYMPHOCYTES NFR BLD: 25 %
MCH RBC QN AUTO: 30.6 PG (ref 26–34)
MCHC RBC AUTO-ENTMCNC: 33.2 G/DL (ref 32–36.5)
MCV RBC AUTO: 92.2 FL (ref 78–100)
MONOCYTES # BLD: 0.6 K/MCL (ref 0.3–0.9)
MONOCYTES NFR BLD: 6 %
NEUTROPHILS # BLD: 6.6 K/MCL (ref 1.8–7.7)
NEUTROPHILS NFR BLD: 68 %
NRBC BLD MANUAL-RTO: 0 /100 WBC
PLATELET # BLD AUTO: 239 K/MCL (ref 140–450)
RBC # BLD: 4.08 MIL/MCL (ref 4–5.2)
RPR SER QL: NONREACTIVE
RUBV IGG SERPL IA-ACNC: 34.4 UNITS/ML
WBC # BLD: 9.7 K/MCL (ref 4.2–11)

## 2022-04-28 LAB
BACTERIA UR CULT: NORMAL
VZV IGG SER IA-ACNC: NORMAL

## 2022-04-28 RX ORDER — PYRIDOXINE HCL (VITAMIN B6) 25 MG
25 TABLET ORAL 3 TIMES DAILY
Qty: 90 TABLET | Refills: 3 | Status: SHIPPED | OUTPATIENT
Start: 2022-04-28 | End: 2022-09-19 | Stop reason: ALTCHOICE

## 2022-04-28 RX ORDER — PYRIDOXINE HCL (VITAMIN B6) 25 MG
25 TABLET ORAL 3 TIMES DAILY
Qty: 90 TABLET | Refills: 3 | Status: SHIPPED | OUTPATIENT
Start: 2022-04-28 | End: 2022-04-28 | Stop reason: SDUPTHER

## 2022-05-26 ENCOUNTER — OFFICE VISIT (OUTPATIENT)
Dept: OBGYN | Age: 27
End: 2022-05-26

## 2022-05-26 ENCOUNTER — OB CHECK (OUTPATIENT)
Dept: OBGYN | Age: 27
End: 2022-05-26

## 2022-05-26 VITALS
OXYGEN SATURATION: 100 % | DIASTOLIC BLOOD PRESSURE: 63 MMHG | HEIGHT: 63 IN | WEIGHT: 136.24 LBS | BODY MASS INDEX: 24.14 KG/M2 | HEART RATE: 73 BPM | SYSTOLIC BLOOD PRESSURE: 100 MMHG | TEMPERATURE: 98.1 F

## 2022-05-26 DIAGNOSIS — Z3A.13 13 WEEKS GESTATION OF PREGNANCY: Primary | ICD-10-CM

## 2022-05-26 DIAGNOSIS — Z3A.08 8 WEEKS GESTATION OF PREGNANCY: ICD-10-CM

## 2022-05-26 PROCEDURE — 76813 OB US NUCHAL MEAS 1 GEST: CPT | Performed by: OBSTETRICS & GYNECOLOGY

## 2022-05-26 PROCEDURE — 0502F SUBSEQUENT PRENATAL CARE: CPT | Performed by: STUDENT IN AN ORGANIZED HEALTH CARE EDUCATION/TRAINING PROGRAM

## 2022-05-26 PROCEDURE — 84702 CHORIONIC GONADOTROPIN TEST: CPT | Performed by: INTERNAL MEDICINE

## 2022-05-26 PROCEDURE — 84163 PAPPA SERUM: CPT | Performed by: INTERNAL MEDICINE

## 2022-05-26 ASSESSMENT — PAIN SCALES - GENERAL: PAINLEVEL: 0

## 2022-06-03 LAB — SERVICE CMNT-IMP: NORMAL

## 2022-06-24 ENCOUNTER — OB CHECK (OUTPATIENT)
Dept: OBGYN | Age: 27
End: 2022-06-24

## 2022-06-24 VITALS
OXYGEN SATURATION: 97 % | BODY MASS INDEX: 25.04 KG/M2 | HEART RATE: 90 BPM | DIASTOLIC BLOOD PRESSURE: 60 MMHG | WEIGHT: 141.31 LBS | TEMPERATURE: 98.3 F | SYSTOLIC BLOOD PRESSURE: 90 MMHG | HEIGHT: 63 IN

## 2022-06-24 DIAGNOSIS — Z3A.17 17 WEEKS GESTATION OF PREGNANCY: Primary | ICD-10-CM

## 2022-06-24 PROCEDURE — 0502F SUBSEQUENT PRENATAL CARE: CPT | Performed by: STUDENT IN AN ORGANIZED HEALTH CARE EDUCATION/TRAINING PROGRAM

## 2022-06-24 PROCEDURE — 36415 COLL VENOUS BLD VENIPUNCTURE: CPT | Performed by: STUDENT IN AN ORGANIZED HEALTH CARE EDUCATION/TRAINING PROGRAM

## 2022-06-24 PROCEDURE — 82105 ALPHA-FETOPROTEIN SERUM: CPT | Performed by: INTERNAL MEDICINE

## 2022-06-24 ASSESSMENT — PAIN SCALES - GENERAL: PAINLEVEL: 0

## 2022-06-27 LAB
# FETUSES US: NORMAL
AFP MOM SERPL: 0.5 MOM
AFP SERPL-MCNC: 19.5 NG/ML
AGE AT DELIVERY: 25.64
GA: NORMAL WK
HX OF TRISOMY 21 NARR: NO
IDDM PATIENT QL: NO
METHOD BEST OVERALL GA ESTIMATE: NORMAL
NEURAL TUBE DEFECT RISK FETUS: NORMAL %
SERVICE CMNT-IMP: NORMAL
SERVICE CMNT-IMP: NORMAL
TOBAC SMOKE STAT: NORMAL
TS 21 RISK FETUS: NORMAL

## 2022-07-21 ENCOUNTER — OFFICE VISIT (OUTPATIENT)
Dept: OBGYN | Age: 27
End: 2022-07-21
Attending: OBSTETRICS & GYNECOLOGY

## 2022-07-21 ENCOUNTER — OB CHECK (OUTPATIENT)
Dept: OBGYN | Age: 27
End: 2022-07-21

## 2022-07-21 VITALS
HEART RATE: 93 BPM | SYSTOLIC BLOOD PRESSURE: 104 MMHG | DIASTOLIC BLOOD PRESSURE: 64 MMHG | HEIGHT: 62 IN | BODY MASS INDEX: 26.78 KG/M2 | TEMPERATURE: 97.6 F | WEIGHT: 145.5 LBS

## 2022-07-21 DIAGNOSIS — Z3A.21 21 WEEKS GESTATION OF PREGNANCY: ICD-10-CM

## 2022-07-21 DIAGNOSIS — Z3A.17 17 WEEKS GESTATION OF PREGNANCY: ICD-10-CM

## 2022-07-21 DIAGNOSIS — R06.02 SHORTNESS OF BREATH: Primary | ICD-10-CM

## 2022-07-21 PROBLEM — Z34.90 PREGNANCY: Status: ACTIVE | Noted: 2022-07-21

## 2022-07-21 PROCEDURE — 0502F SUBSEQUENT PRENATAL CARE: CPT | Performed by: STUDENT IN AN ORGANIZED HEALTH CARE EDUCATION/TRAINING PROGRAM

## 2022-07-21 PROCEDURE — 76817 TRANSVAGINAL US OBSTETRIC: CPT | Performed by: OBSTETRICS & GYNECOLOGY

## 2022-07-21 PROCEDURE — 76805 OB US >/= 14 WKS SNGL FETUS: CPT | Performed by: OBSTETRICS & GYNECOLOGY

## 2022-07-21 ASSESSMENT — PAIN SCALES - GENERAL: PAINLEVEL: 0

## 2022-08-12 DIAGNOSIS — Z36.2 ENCOUNTER FOR OTHER ANTENATAL SCREENING FOLLOW-UP: Primary | ICD-10-CM

## 2022-08-22 ENCOUNTER — APPOINTMENT (OUTPATIENT)
Dept: OBGYN | Age: 27
End: 2022-08-22

## 2022-08-22 ENCOUNTER — OB CHECK (OUTPATIENT)
Dept: OBGYN | Age: 27
End: 2022-08-22

## 2022-08-22 VITALS
HEART RATE: 95 BPM | SYSTOLIC BLOOD PRESSURE: 106 MMHG | DIASTOLIC BLOOD PRESSURE: 62 MMHG | BODY MASS INDEX: 28.52 KG/M2 | WEIGHT: 154.98 LBS | HEIGHT: 62 IN

## 2022-08-22 DIAGNOSIS — Z34.02 ENCOUNTER FOR SUPERVISION OF NORMAL FIRST PREGNANCY IN SECOND TRIMESTER: Primary | ICD-10-CM

## 2022-08-22 LAB
BASOPHILS # BLD: 0 K/MCL (ref 0–0.3)
BASOPHILS NFR BLD: 0 %
DEPRECATED RDW RBC: 48.4 FL (ref 39–50)
EOSINOPHIL # BLD: 0 K/MCL (ref 0–0.5)
EOSINOPHIL NFR BLD: 0 %
ERYTHROCYTE [DISTWIDTH] IN BLOOD: 13.5 % (ref 11–15)
GLUCOSE 1H P 50 G GLC PO SERPL-MCNC: 159 MG/DL (ref 65–139)
HCT VFR BLD CALC: 34.4 % (ref 36–46.5)
HGB BLD-MCNC: 11.2 G/DL (ref 12–15.5)
IMM GRANULOCYTES # BLD AUTO: 0.1 K/MCL (ref 0–0.2)
IMM GRANULOCYTES # BLD: 1 %
LYMPHOCYTES # BLD: 1.9 K/MCL (ref 1–4.8)
LYMPHOCYTES NFR BLD: 20 %
MCH RBC QN AUTO: 31.4 PG (ref 26–34)
MCHC RBC AUTO-ENTMCNC: 32.6 G/DL (ref 32–36.5)
MCV RBC AUTO: 96.4 FL (ref 78–100)
MONOCYTES # BLD: 0.5 K/MCL (ref 0.3–0.9)
MONOCYTES NFR BLD: 5 %
NEUTROPHILS # BLD: 7 K/MCL (ref 1.8–7.7)
NEUTROPHILS NFR BLD: 74 %
NRBC BLD MANUAL-RTO: 0 /100 WBC
PLATELET # BLD AUTO: 240 K/MCL (ref 140–450)
RBC # BLD: 3.57 MIL/MCL (ref 4–5.2)
WBC # BLD: 9.6 K/MCL (ref 4.2–11)

## 2022-08-22 PROCEDURE — 82950 GLUCOSE TEST: CPT | Performed by: INTERNAL MEDICINE

## 2022-08-22 PROCEDURE — 85025 COMPLETE CBC W/AUTO DIFF WBC: CPT | Performed by: INTERNAL MEDICINE

## 2022-08-22 PROCEDURE — 0502F SUBSEQUENT PRENATAL CARE: CPT

## 2022-08-23 DIAGNOSIS — Z34.02 ENCOUNTER FOR SUPERVISION OF NORMAL FIRST PREGNANCY IN SECOND TRIMESTER: Primary | ICD-10-CM

## 2022-08-24 ENCOUNTER — LAB SERVICES (OUTPATIENT)
Dept: LAB | Age: 27
End: 2022-08-24

## 2022-08-24 ENCOUNTER — DOCUMENTATION (OUTPATIENT)
Dept: OBGYN | Age: 27
End: 2022-08-24

## 2022-08-24 DIAGNOSIS — R73.09 GTT (GLUCOSE TOLERANCE TEST) ABNORMAL: ICD-10-CM

## 2022-08-24 DIAGNOSIS — Z34.02 ENCOUNTER FOR SUPERVISION OF NORMAL FIRST PREGNANCY IN SECOND TRIMESTER: ICD-10-CM

## 2022-08-24 LAB
FASTING DURATION TIME PATIENT: 12 HOURS (ref 0–999)
GLUCOSE 1H P 100 G GLC PO SERPL-MCNC: 138 MG/DL (ref 65–179)
GLUCOSE 2H P 100 G GLC PO SERPL-MCNC: 131 MG/DL (ref 65–154)
GLUCOSE 3H P 100 G GLC PO SERPL-MCNC: 111 MG/DL (ref 65–139)
GLUCOSE P FAST SERPL-MCNC: 83 MG/DL (ref 65–94)

## 2022-08-24 PROCEDURE — 82952 GTT-ADDED SAMPLES: CPT | Performed by: INTERNAL MEDICINE

## 2022-08-24 PROCEDURE — 36415 COLL VENOUS BLD VENIPUNCTURE: CPT | Performed by: INTERNAL MEDICINE

## 2022-08-24 PROCEDURE — 82951 GLUCOSE TOLERANCE TEST (GTT): CPT | Performed by: INTERNAL MEDICINE

## 2022-09-19 ENCOUNTER — OB CHECK (OUTPATIENT)
Dept: OBGYN | Age: 27
End: 2022-09-19

## 2022-09-19 VITALS
BODY MASS INDEX: 29.29 KG/M2 | SYSTOLIC BLOOD PRESSURE: 94 MMHG | TEMPERATURE: 97.8 F | WEIGHT: 159.17 LBS | DIASTOLIC BLOOD PRESSURE: 64 MMHG | OXYGEN SATURATION: 97 % | HEIGHT: 62 IN | HEART RATE: 101 BPM

## 2022-09-19 DIAGNOSIS — O99.613 CONSTIPATION DURING PREGNANCY IN THIRD TRIMESTER: ICD-10-CM

## 2022-09-19 DIAGNOSIS — Z3A.29 29 WEEKS GESTATION OF PREGNANCY: Primary | ICD-10-CM

## 2022-09-19 DIAGNOSIS — K59.00 CONSTIPATION DURING PREGNANCY IN THIRD TRIMESTER: ICD-10-CM

## 2022-09-19 PROCEDURE — 0502F SUBSEQUENT PRENATAL CARE: CPT | Performed by: STUDENT IN AN ORGANIZED HEALTH CARE EDUCATION/TRAINING PROGRAM

## 2022-09-19 PROCEDURE — 90715 TDAP VACCINE 7 YRS/> IM: CPT | Performed by: OBSTETRICS & GYNECOLOGY

## 2022-09-19 RX ORDER — SENNA AND DOCUSATE SODIUM 50; 8.6 MG/1; MG/1
2 TABLET, FILM COATED ORAL DAILY
Qty: 60 TABLET | Refills: 3 | Status: ON HOLD | OUTPATIENT
Start: 2022-09-19 | End: 2022-12-04 | Stop reason: HOSPADM

## 2022-10-03 ENCOUNTER — OB CHECK (OUTPATIENT)
Dept: OBGYN | Age: 27
End: 2022-10-03

## 2022-10-03 VITALS
SYSTOLIC BLOOD PRESSURE: 93 MMHG | DIASTOLIC BLOOD PRESSURE: 63 MMHG | BODY MASS INDEX: 30.2 KG/M2 | TEMPERATURE: 98.2 F | HEIGHT: 62 IN | HEART RATE: 102 BPM | OXYGEN SATURATION: 98 % | WEIGHT: 164.13 LBS

## 2022-10-03 DIAGNOSIS — Z3A.31 31 WEEKS GESTATION OF PREGNANCY: Primary | ICD-10-CM

## 2022-10-03 PROCEDURE — 36415 COLL VENOUS BLD VENIPUNCTURE: CPT | Performed by: OBSTETRICS & GYNECOLOGY

## 2022-10-03 PROCEDURE — 87389 HIV-1 AG W/HIV-1&-2 AB AG IA: CPT | Performed by: INTERNAL MEDICINE

## 2022-10-03 PROCEDURE — 86592 SYPHILIS TEST NON-TREP QUAL: CPT | Performed by: INTERNAL MEDICINE

## 2022-10-03 PROCEDURE — 0502F SUBSEQUENT PRENATAL CARE: CPT | Performed by: STUDENT IN AN ORGANIZED HEALTH CARE EDUCATION/TRAINING PROGRAM

## 2022-10-03 ASSESSMENT — PAIN SCALES - GENERAL: PAINLEVEL: 0

## 2022-10-04 LAB
HIV 1+2 AB+HIV1 P24 AG SERPL QL IA: NONREACTIVE
RPR SER QL: NONREACTIVE

## 2022-10-17 ENCOUNTER — OB CHECK (OUTPATIENT)
Dept: OBGYN | Age: 27
End: 2022-10-17

## 2022-10-17 VITALS
HEIGHT: 62 IN | SYSTOLIC BLOOD PRESSURE: 105 MMHG | WEIGHT: 165.68 LBS | DIASTOLIC BLOOD PRESSURE: 60 MMHG | BODY MASS INDEX: 30.49 KG/M2 | HEART RATE: 77 BPM

## 2022-10-17 DIAGNOSIS — Z3A.33 33 WEEKS GESTATION OF PREGNANCY: Primary | ICD-10-CM

## 2022-10-17 PROCEDURE — 0502F SUBSEQUENT PRENATAL CARE: CPT | Performed by: STUDENT IN AN ORGANIZED HEALTH CARE EDUCATION/TRAINING PROGRAM

## 2022-11-01 ENCOUNTER — OB CHECK (OUTPATIENT)
Dept: OBGYN | Age: 27
End: 2022-11-01

## 2022-11-01 ENCOUNTER — TELEPHONE (OUTPATIENT)
Dept: OBGYN | Age: 27
End: 2022-11-01

## 2022-11-01 VITALS
HEART RATE: 86 BPM | OXYGEN SATURATION: 98 % | SYSTOLIC BLOOD PRESSURE: 108 MMHG | WEIGHT: 169.42 LBS | DIASTOLIC BLOOD PRESSURE: 65 MMHG | BODY MASS INDEX: 31.18 KG/M2 | HEIGHT: 62 IN

## 2022-11-01 DIAGNOSIS — Z3A.35 35 WEEKS GESTATION OF PREGNANCY: Primary | ICD-10-CM

## 2022-11-01 PROCEDURE — 87081 CULTURE SCREEN ONLY: CPT | Performed by: INTERNAL MEDICINE

## 2022-11-01 PROCEDURE — 0502F SUBSEQUENT PRENATAL CARE: CPT | Performed by: STUDENT IN AN ORGANIZED HEALTH CARE EDUCATION/TRAINING PROGRAM

## 2022-11-04 LAB — GP B STREP SPEC QL CULT: NORMAL

## 2022-11-07 ENCOUNTER — OB CHECK (OUTPATIENT)
Dept: OBGYN | Age: 27
End: 2022-11-07

## 2022-11-07 VITALS
HEART RATE: 84 BPM | BODY MASS INDEX: 31.5 KG/M2 | WEIGHT: 171.19 LBS | TEMPERATURE: 100 F | DIASTOLIC BLOOD PRESSURE: 72 MMHG | HEIGHT: 62 IN | SYSTOLIC BLOOD PRESSURE: 102 MMHG

## 2022-11-07 DIAGNOSIS — Z3A.36 36 WEEKS GESTATION OF PREGNANCY: Primary | ICD-10-CM

## 2022-11-07 PROCEDURE — 0502F SUBSEQUENT PRENATAL CARE: CPT | Performed by: STUDENT IN AN ORGANIZED HEALTH CARE EDUCATION/TRAINING PROGRAM

## 2022-11-07 ASSESSMENT — PAIN SCALES - GENERAL: PAINLEVEL: 0

## 2022-11-14 ENCOUNTER — OB CHECK (OUTPATIENT)
Dept: OBGYN | Age: 27
End: 2022-11-14

## 2022-11-14 VITALS
BODY MASS INDEX: 32.19 KG/M2 | HEART RATE: 84 BPM | SYSTOLIC BLOOD PRESSURE: 110 MMHG | DIASTOLIC BLOOD PRESSURE: 67 MMHG | HEIGHT: 62 IN | WEIGHT: 174.94 LBS

## 2022-11-14 DIAGNOSIS — Z3A.37 37 WEEKS GESTATION OF PREGNANCY: Primary | ICD-10-CM

## 2022-11-14 PROCEDURE — 0502F SUBSEQUENT PRENATAL CARE: CPT | Performed by: OBSTETRICS & GYNECOLOGY

## 2022-11-14 ASSESSMENT — PAIN SCALES - GENERAL: PAINLEVEL: 0

## 2022-11-21 ENCOUNTER — OB CHECK (OUTPATIENT)
Dept: OBGYN | Age: 27
End: 2022-11-21

## 2022-11-21 VITALS
DIASTOLIC BLOOD PRESSURE: 71 MMHG | SYSTOLIC BLOOD PRESSURE: 115 MMHG | HEIGHT: 62 IN | WEIGHT: 178.35 LBS | HEART RATE: 94 BPM | BODY MASS INDEX: 32.82 KG/M2

## 2022-11-21 DIAGNOSIS — Z3A.38 38 WEEKS GESTATION OF PREGNANCY: Primary | ICD-10-CM

## 2022-11-21 PROCEDURE — 0502F SUBSEQUENT PRENATAL CARE: CPT | Performed by: STUDENT IN AN ORGANIZED HEALTH CARE EDUCATION/TRAINING PROGRAM

## 2022-11-28 ENCOUNTER — OB CHECK (OUTPATIENT)
Dept: OBGYN | Age: 27
End: 2022-11-28

## 2022-11-28 VITALS
HEIGHT: 62 IN | WEIGHT: 175.38 LBS | SYSTOLIC BLOOD PRESSURE: 120 MMHG | OXYGEN SATURATION: 99 % | HEART RATE: 86 BPM | BODY MASS INDEX: 32.27 KG/M2 | DIASTOLIC BLOOD PRESSURE: 83 MMHG

## 2022-11-28 DIAGNOSIS — Z3A.39 39 WEEKS GESTATION OF PREGNANCY: Primary | ICD-10-CM

## 2022-11-28 PROCEDURE — 0502F SUBSEQUENT PRENATAL CARE: CPT | Performed by: STUDENT IN AN ORGANIZED HEALTH CARE EDUCATION/TRAINING PROGRAM

## 2022-11-29 ENCOUNTER — LAB SERVICES (OUTPATIENT)
Dept: LAB | Age: 27
End: 2022-11-29

## 2022-11-29 DIAGNOSIS — Z01.812 PRE-PROCEDURE LAB EXAM: Primary | ICD-10-CM

## 2022-11-29 LAB
SARS-COV-2 RNA RESP QL NAA+PROBE: NOT DETECTED
SERVICE CMNT-IMP: NORMAL
SERVICE CMNT-IMP: NORMAL

## 2022-11-29 PROCEDURE — U0003 INFECTIOUS AGENT DETECTION BY NUCLEIC ACID (DNA OR RNA); SEVERE ACUTE RESPIRATORY SYNDROME CORONAVIRUS 2 (SARS-COV-2) (CORONAVIRUS DISEASE [COVID-19]), AMPLIFIED PROBE TECHNIQUE, MAKING USE OF HIGH THROUGHPUT TECHNOLOGIES AS DESCRIBED BY CMS-2020-01-R: HCPCS | Performed by: INTERNAL MEDICINE

## 2022-11-29 PROCEDURE — U0005 INFEC AGEN DETEC AMPLI PROBE: HCPCS | Performed by: INTERNAL MEDICINE

## 2022-12-01 ENCOUNTER — HOSPITAL ENCOUNTER (INPATIENT)
Age: 27
LOS: 4 days | Discharge: HOME | DRG: 540 | End: 2022-12-05
Attending: OBSTETRICS & GYNECOLOGY | Admitting: OBSTETRICS & GYNECOLOGY

## 2022-12-01 DIAGNOSIS — Z34.90 ENCOUNTER FOR INDUCTION OF LABOR: ICD-10-CM

## 2022-12-01 DIAGNOSIS — Z98.891 STATUS POST CESAREAN SECTION: Primary | ICD-10-CM

## 2022-12-01 LAB
ABO + RH BLD: NORMAL
APPEARANCE UR: CLEAR
BASOPHILS # BLD: 0 K/MCL (ref 0–0.3)
BASOPHILS NFR BLD: 0 %
BILIRUB UR QL STRIP: NEGATIVE
BLD GP AB SCN SERPL QL GEL: NEGATIVE
COLOR UR: YELLOW
DEPRECATED RDW RBC: 43.5 FL (ref 39–50)
EOSINOPHIL # BLD: 0 K/MCL (ref 0–0.5)
EOSINOPHIL NFR BLD: 1 %
ERYTHROCYTE [DISTWIDTH] IN BLOOD: 13.2 % (ref 11–15)
GLUCOSE UR STRIP-MCNC: NEGATIVE MG/DL
HCT VFR BLD CALC: 35.4 % (ref 36–46.5)
HGB BLD-MCNC: 11.9 G/DL (ref 12–15.5)
HGB UR QL STRIP: NEGATIVE
IMM GRANULOCYTES # BLD AUTO: 0.1 K/MCL (ref 0–0.2)
IMM GRANULOCYTES # BLD: 1 %
KETONES UR STRIP-MCNC: NEGATIVE MG/DL
LEUKOCYTE ESTERASE UR QL STRIP: NEGATIVE
LYMPHOCYTES # BLD: 2.8 K/MCL (ref 1–4.8)
LYMPHOCYTES NFR BLD: 32 %
MCH RBC QN AUTO: 30.5 PG (ref 26–34)
MCHC RBC AUTO-ENTMCNC: 33.6 G/DL (ref 32–36.5)
MCV RBC AUTO: 90.8 FL (ref 78–100)
MONOCYTES # BLD: 0.8 K/MCL (ref 0.3–0.9)
MONOCYTES NFR BLD: 9 %
NEUTROPHILS # BLD: 5.2 K/MCL (ref 1.8–7.7)
NEUTROPHILS NFR BLD: 57 %
NITRITE UR QL STRIP: NEGATIVE
NRBC BLD MANUAL-RTO: 0 /100 WBC
PH UR STRIP: 7 UNITS (ref 5–7)
PLATELET # BLD AUTO: 160 K/MCL (ref 140–450)
PROT UR STRIP-MCNC: ABNORMAL MG/DL
RBC # BLD: 3.9 MIL/MCL (ref 4–5.2)
SP GR UR STRIP: 1.02 (ref 1–1.03)
TYPE AND SCREEN EXPIRATION DATE: NORMAL
UROBILINOGEN UR STRIP-MCNC: 0.2 MG/DL
WBC # BLD: 8.8 K/MCL (ref 4.2–11)

## 2022-12-01 PROCEDURE — 3E033VJ INTRODUCTION OF OTHER HORMONE INTO PERIPHERAL VEIN, PERCUTANEOUS APPROACH: ICD-10-PCS | Performed by: OBSTETRICS & GYNECOLOGY

## 2022-12-01 PROCEDURE — 85025 COMPLETE CBC W/AUTO DIFF WBC: CPT

## 2022-12-01 PROCEDURE — 99223 1ST HOSP IP/OBS HIGH 75: CPT

## 2022-12-01 PROCEDURE — 86900 BLOOD TYPING SEROLOGIC ABO: CPT

## 2022-12-01 PROCEDURE — 3E0P7VZ INTRODUCTION OF HORMONE INTO FEMALE REPRODUCTIVE, VIA NATURAL OR ARTIFICIAL OPENING: ICD-10-PCS | Performed by: OBSTETRICS & GYNECOLOGY

## 2022-12-01 PROCEDURE — 10907ZC DRAINAGE OF AMNIOTIC FLUID, THERAPEUTIC FROM PRODUCTS OF CONCEPTION, VIA NATURAL OR ARTIFICIAL OPENING: ICD-10-PCS | Performed by: OBSTETRICS & GYNECOLOGY

## 2022-12-01 PROCEDURE — 13003286 HB ROOM CHARGE L&D

## 2022-12-01 PROCEDURE — 10002803 HB RX 637

## 2022-12-01 RX ORDER — LIDOCAINE HYDROCHLORIDE 10 MG/ML
30 INJECTION, SOLUTION EPIDURAL; INFILTRATION; INTRACAUDAL; PERINEURAL
Status: DISCONTINUED | OUTPATIENT
Start: 2022-12-01 | End: 2022-12-03

## 2022-12-01 RX ORDER — 0.9 % SODIUM CHLORIDE 0.9 %
2 VIAL (ML) INJECTION EVERY 12 HOURS SCHEDULED
Status: DISCONTINUED | OUTPATIENT
Start: 2022-12-01 | End: 2022-12-03

## 2022-12-01 RX ORDER — OXYTOCIN-SODIUM CHLORIDE 0.9% IV SOLN 30 UNIT/500ML 30-0.9/5 UT/ML-%
0-334 SOLUTION INTRAVENOUS CONTINUOUS
Status: DISCONTINUED | OUTPATIENT
Start: 2022-12-01 | End: 2022-12-03

## 2022-12-01 RX ORDER — LIDOCAINE HYDROCHLORIDE 10 MG/ML
1 INJECTION, SOLUTION INFILTRATION; PERINEURAL ONCE
Status: DISCONTINUED | OUTPATIENT
Start: 2022-12-01 | End: 2022-12-03

## 2022-12-01 RX ORDER — SODIUM CHLORIDE, SODIUM LACTATE, POTASSIUM CHLORIDE, CALCIUM CHLORIDE 600; 310; 30; 20 MG/100ML; MG/100ML; MG/100ML; MG/100ML
INJECTION, SOLUTION INTRAVENOUS CONTINUOUS
Status: DISCONTINUED | OUTPATIENT
Start: 2022-12-01 | End: 2022-12-03

## 2022-12-01 RX ORDER — ONDANSETRON 2 MG/ML
4 INJECTION INTRAMUSCULAR; INTRAVENOUS 2 TIMES DAILY PRN
Status: DISCONTINUED | OUTPATIENT
Start: 2022-12-01 | End: 2022-12-03

## 2022-12-01 RX ORDER — CALCIUM CARBONATE 500 MG/1
500 TABLET, CHEWABLE ORAL EVERY 4 HOURS PRN
Status: DISCONTINUED | OUTPATIENT
Start: 2022-12-01 | End: 2022-12-03

## 2022-12-01 RX ORDER — DEXTROSE, SODIUM CHLORIDE, SODIUM LACTATE, POTASSIUM CHLORIDE, AND CALCIUM CHLORIDE 5; .6; .31; .03; .02 G/100ML; G/100ML; G/100ML; G/100ML; G/100ML
INJECTION, SOLUTION INTRAVENOUS CONTINUOUS
Status: DISCONTINUED | OUTPATIENT
Start: 2022-12-01 | End: 2022-12-03

## 2022-12-01 RX ORDER — FAMOTIDINE 20 MG/1
20 TABLET, FILM COATED ORAL 2 TIMES DAILY PRN
Status: DISCONTINUED | OUTPATIENT
Start: 2022-12-01 | End: 2022-12-03

## 2022-12-01 RX ORDER — OXYTOCIN 10 [USP'U]/ML
10 INJECTION, SOLUTION INTRAMUSCULAR; INTRAVENOUS
Status: DISCONTINUED | OUTPATIENT
Start: 2022-12-01 | End: 2022-12-03

## 2022-12-01 RX ADMIN — MISOPROSTOL 25 MCG: 100 TABLET ORAL at 21:20

## 2022-12-01 ASSESSMENT — LIFESTYLE VARIABLES
RECENT DECLINE IN ADLS: NO
ARE YOU BLIND OR DO YOU HAVE SERIOUS DIFFICULTY SEEING, EVEN WHEN WEARING GLASSES: NO
AUDIT-C TOTAL SCORE: 0
ALCOHOL_USE_STATUS: NO OR LOW RISK WITH VALIDATED TOOL
HOW OFTEN DO YOU HAVE A DRINK CONTAINING ALCOHOL: NEVER
ADL BEFORE ADMISSION: INDEPENDENT
RECENTLY LOST WEIGHT WITHOUT TRYING: 0
HAVE YOU BEEN EATING POORLY BECAUSE OF A DECREASED APPETITE: 0
CHRONIC/CANCER PAIN PRESENT: NO
LAST DRINK YOU HAD: DENIES INTAKE
SHORT OF BREATH OR FATIGUE WITH ADLS: NO
HOW MANY STANDARD DRINKS CONTAINING ALCOHOL DO YOU HAVE ON A TYPICAL DAY: 0,1 OR 2
HOW OFTEN DO YOU HAVE 6 OR MORE DRINKS ON ONE OCCASION: NEVER
IS PATIENT ABLE TO COMPLETE ASSESSMENT AT THIS TIME: YES
ARE YOU DEAF OR DO YOU HAVE SERIOUS DIFFICULTY  HEARING: NO
ADL NEEDS ASSIST: NO
HISTORY OF PROBLEMS WHEN YOU STOP DRINKING ALCOHOL: NO

## 2022-12-01 ASSESSMENT — COGNITIVE AND FUNCTIONAL STATUS - GENERAL
BECAUSE OF A PHYSICAL, MENTAL, OR EMOTIONAL CONDITION, DO YOU HAVE DIFFICULTY DOING ERRANDS ALONE: NO
BECAUSE OF A PHYSICAL, MENTAL, OR EMOTIONAL CONDITION, DO YOU HAVE SERIOUS DIFFICULTY CONCENTRATING, REMEMBERING OR MAKING DECISIONS: NO
DO YOU HAVE DIFFICULTY DRESSING OR BATHING: NO
DO YOU HAVE SERIOUS DIFFICULTY WALKING OR CLIMBING STAIRS: NO

## 2022-12-01 ASSESSMENT — ACTIVITIES OF DAILY LIVING (ADL): ADL_SCORE: 12

## 2022-12-02 ENCOUNTER — ANESTHESIA EVENT (OUTPATIENT)
Dept: OBGYN | Age: 27
DRG: 540 | End: 2022-12-02

## 2022-12-02 ENCOUNTER — ANESTHESIA (OUTPATIENT)
Dept: OBGYN | Age: 27
DRG: 540 | End: 2022-12-02

## 2022-12-02 PROCEDURE — 10002807 HB RX 258: Performed by: ANESTHESIOLOGY

## 2022-12-02 PROCEDURE — 13003286 HB ROOM CHARGE L&D

## 2022-12-02 PROCEDURE — 10004651 HB RX, NO CHARGE ITEM

## 2022-12-02 PROCEDURE — 10H07YZ INSERTION OF OTHER DEVICE INTO PRODUCTS OF CONCEPTION, VIA NATURAL OR ARTIFICIAL OPENING: ICD-10-PCS | Performed by: OBSTETRICS & GYNECOLOGY

## 2022-12-02 PROCEDURE — 10002807 HB RX 258

## 2022-12-02 PROCEDURE — 10002801 HB RX 250 W/O HCPCS: Performed by: ANESTHESIOLOGY

## 2022-12-02 PROCEDURE — 10002803 HB RX 637

## 2022-12-02 PROCEDURE — 10002800 HB RX 250 W HCPCS: Performed by: OBSTETRICS & GYNECOLOGY

## 2022-12-02 PROCEDURE — 10002800 HB RX 250 W HCPCS

## 2022-12-02 RX ORDER — HEPARIN SOD,PORK IN 0.45% NACL 25000/500
INTRAVENOUS SOLUTION INTRAVENOUS
Status: COMPLETED
Start: 2022-12-02 | End: 2022-12-02

## 2022-12-02 RX ORDER — EPHEDRINE SULFATE/0.9% NACL/PF 50 MG/10ML
10 SYRINGE (ML) INTRAVENOUS
Status: DISCONTINUED | OUTPATIENT
Start: 2022-12-02 | End: 2022-12-03

## 2022-12-02 RX ORDER — LIDOCAINE HYDROCHLORIDE 10 MG/ML
INJECTION, SOLUTION EPIDURAL; INFILTRATION; INTRACAUDAL; PERINEURAL
Status: COMPLETED | OUTPATIENT
Start: 2022-12-02 | End: 2022-12-02

## 2022-12-02 RX ORDER — OXYTOCIN/0.9 % SODIUM CHLORIDE 30/500 ML
0-30 PLASTIC BAG, INJECTION (ML) INTRAVENOUS CONTINUOUS
Status: DISCONTINUED | OUTPATIENT
Start: 2022-12-02 | End: 2022-12-03

## 2022-12-02 RX ORDER — DIPHENHYDRAMINE HYDROCHLORIDE 50 MG/ML
25 INJECTION INTRAMUSCULAR; INTRAVENOUS ONCE
Status: COMPLETED | OUTPATIENT
Start: 2022-12-02 | End: 2022-12-02

## 2022-12-02 RX ORDER — BUPIVACAINE HYDROCHLORIDE 2.5 MG/ML
INJECTION, SOLUTION EPIDURAL; INFILTRATION; INTRACAUDAL
Status: COMPLETED | OUTPATIENT
Start: 2022-12-02 | End: 2022-12-02

## 2022-12-02 RX ORDER — EPHEDRINE SULFATE/0.9% NACL/PF 50 MG/10ML
5 SYRINGE (ML) INTRAVENOUS
Status: DISCONTINUED | OUTPATIENT
Start: 2022-12-02 | End: 2022-12-03

## 2022-12-02 RX ORDER — HEPARIN SOD,PORK IN 0.45% NACL 25000/500
INTRAVENOUS SOLUTION INTRAVENOUS CONTINUOUS
Status: DISCONTINUED | OUTPATIENT
Start: 2022-12-02 | End: 2022-12-03

## 2022-12-02 RX ORDER — ACETAMINOPHEN 500 MG
1000 TABLET ORAL ONCE
Status: COMPLETED | OUTPATIENT
Start: 2022-12-02 | End: 2022-12-02

## 2022-12-02 RX ADMIN — OXYTOCIN-SODIUM CHLORIDE 0.9% IV SOLN 30 UNIT/500ML 2 MILLI-UNITS/MIN: 30-0.9/5 SOLUTION at 06:58

## 2022-12-02 RX ADMIN — SODIUM CHLORIDE, SODIUM LACTATE, POTASSIUM CHLORIDE, CALCIUM CHLORIDE AND DEXTROSE MONOHYDRATE: 5; 600; 310; 30; 20 INJECTION, SOLUTION INTRAVENOUS at 23:38

## 2022-12-02 RX ADMIN — HYDROMORPHONE HYDROCHLORIDE 1 MG: 1 INJECTION, SOLUTION INTRAMUSCULAR; INTRAVENOUS; SUBCUTANEOUS at 13:12

## 2022-12-02 RX ADMIN — OXYTOCIN-SODIUM CHLORIDE 0.9% IV SOLN 30 UNIT/500ML 2 MILLI-UNITS/MIN: 30-0.9/5 SOLUTION at 21:35

## 2022-12-02 RX ADMIN — LIDOCAINE HYDROCHLORIDE 3 ML: 10 INJECTION, SOLUTION EPIDURAL; INFILTRATION; INTRACAUDAL; PERINEURAL at 16:12

## 2022-12-02 RX ADMIN — DIPHENHYDRAMINE HYDROCHLORIDE 25 MG: 50 INJECTION, SOLUTION INTRAMUSCULAR; INTRAVENOUS at 22:58

## 2022-12-02 RX ADMIN — SODIUM CHLORIDE, POTASSIUM CHLORIDE, SODIUM LACTATE AND CALCIUM CHLORIDE 500 ML: 600; 310; 30; 20 INJECTION, SOLUTION INTRAVENOUS at 18:36

## 2022-12-02 RX ADMIN — SODIUM CHLORIDE, POTASSIUM CHLORIDE, SODIUM LACTATE AND CALCIUM CHLORIDE: 600; 310; 30; 20 INJECTION, SOLUTION INTRAVENOUS at 16:03

## 2022-12-02 RX ADMIN — SODIUM CHLORIDE, SODIUM LACTATE, POTASSIUM CHLORIDE, CALCIUM CHLORIDE AND DEXTROSE MONOHYDRATE: 5; 600; 310; 30; 20 INJECTION, SOLUTION INTRAVENOUS at 20:41

## 2022-12-02 RX ADMIN — SODIUM CHLORIDE, POTASSIUM CHLORIDE, SODIUM LACTATE AND CALCIUM CHLORIDE: 600; 310; 30; 20 INJECTION, SOLUTION INTRAVENOUS at 05:13

## 2022-12-02 RX ADMIN — Medication 10 ML/HR: at 16:28

## 2022-12-02 RX ADMIN — BUPIVACAINE HYDROCHLORIDE 1.2 ML: 2.5 INJECTION, SOLUTION EPIDURAL; INFILTRATION; INTRACAUDAL at 16:21

## 2022-12-02 RX ADMIN — MISOPROSTOL 25 MCG: 100 TABLET ORAL at 01:27

## 2022-12-02 RX ADMIN — ACETAMINOPHEN 1000 MG: 500 TABLET ORAL at 23:33

## 2022-12-02 RX ADMIN — Medication 10 ML/HR: at 23:44

## 2022-12-02 ASSESSMENT — ENCOUNTER SYMPTOMS: EXERCISE TOLERANCE: GOOD (>4 METS)

## 2022-12-03 PROCEDURE — 10002807 HB RX 258: Performed by: ANESTHESIOLOGY

## 2022-12-03 PROCEDURE — 10006023 HB SUPPLY 272: Performed by: OBSTETRICS & GYNECOLOGY

## 2022-12-03 PROCEDURE — 10004180 HB COUNTER-TRANSPORT

## 2022-12-03 PROCEDURE — 13000012 HB ANESTHESIA SPINAL/EPIDURAL IN L&D: Performed by: OBSTETRICS & GYNECOLOGY

## 2022-12-03 PROCEDURE — 10002801 HB RX 250 W/O HCPCS

## 2022-12-03 PROCEDURE — 10002807 HB RX 258

## 2022-12-03 PROCEDURE — 13000037 HB COMPLEX CASE EACH ADD MINUTE: Performed by: OBSTETRICS & GYNECOLOGY

## 2022-12-03 PROCEDURE — 13001455 HB PERINATAL CARE HIGH RISK

## 2022-12-03 PROCEDURE — 59514 CESAREAN DELIVERY ONLY: CPT | Performed by: OBSTETRICS & GYNECOLOGY

## 2022-12-03 PROCEDURE — 10000003 HB ROOM CHARGE WOMEN'S HEALTH

## 2022-12-03 PROCEDURE — 10002800 HB RX 250 W HCPCS: Performed by: ANESTHESIOLOGY

## 2022-12-03 PROCEDURE — 10004651 HB RX, NO CHARGE ITEM

## 2022-12-03 PROCEDURE — 10002800 HB RX 250 W HCPCS

## 2022-12-03 PROCEDURE — 13000036 HB COMPLEX  CASE S/U + 1ST 15 MIN: Performed by: OBSTETRICS & GYNECOLOGY

## 2022-12-03 PROCEDURE — 13000001 HB PHASE II RECOVERY EA 30 MINUTES: Performed by: OBSTETRICS & GYNECOLOGY

## 2022-12-03 PROCEDURE — 10002807 HB RX 258: Performed by: OBSTETRICS & GYNECOLOGY

## 2022-12-03 PROCEDURE — 10002801 HB RX 250 W/O HCPCS: Performed by: ANESTHESIOLOGY

## 2022-12-03 PROCEDURE — 10002800 HB RX 250 W HCPCS: Performed by: OBSTETRICS & GYNECOLOGY

## 2022-12-03 RX ORDER — PROCHLORPERAZINE EDISYLATE 5 MG/ML
5 INJECTION INTRAMUSCULAR; INTRAVENOUS EVERY 4 HOURS PRN
Status: DISCONTINUED | OUTPATIENT
Start: 2022-12-03 | End: 2022-12-05 | Stop reason: HOSPADM

## 2022-12-03 RX ORDER — POLYETHYLENE GLYCOL 3350 17 G/17G
17 POWDER, FOR SOLUTION ORAL DAILY PRN
Status: DISCONTINUED | OUTPATIENT
Start: 2022-12-03 | End: 2022-12-04

## 2022-12-03 RX ORDER — ACETAMINOPHEN 500 MG
1000 TABLET ORAL EVERY 8 HOURS SCHEDULED
Status: DISCONTINUED | OUTPATIENT
Start: 2022-12-03 | End: 2022-12-05 | Stop reason: HOSPADM

## 2022-12-03 RX ORDER — TRANEXAMIC ACID 10 MG/ML
1000 INJECTION, SOLUTION INTRAVENOUS
Status: DISCONTINUED | OUTPATIENT
Start: 2022-12-03 | End: 2022-12-05 | Stop reason: HOSPADM

## 2022-12-03 RX ORDER — DEXAMETHASONE SODIUM PHOSPHATE 4 MG/ML
INJECTION, SOLUTION INTRA-ARTICULAR; INTRALESIONAL; INTRAMUSCULAR; INTRAVENOUS; SOFT TISSUE PRN
Status: DISCONTINUED | OUTPATIENT
Start: 2022-12-03 | End: 2022-12-03

## 2022-12-03 RX ORDER — SODIUM CHLORIDE, SODIUM LACTATE, POTASSIUM CHLORIDE, CALCIUM CHLORIDE 600; 310; 30; 20 MG/100ML; MG/100ML; MG/100ML; MG/100ML
INJECTION, SOLUTION INTRAVENOUS CONTINUOUS
Status: DISCONTINUED | OUTPATIENT
Start: 2022-12-03 | End: 2022-12-05 | Stop reason: HOSPADM

## 2022-12-03 RX ORDER — SODIUM CHLORIDE 9 MG/ML
INJECTION, SOLUTION INTRAVENOUS CONTINUOUS
Status: DISCONTINUED | OUTPATIENT
Start: 2022-12-03 | End: 2022-12-03

## 2022-12-03 RX ORDER — OXYTOCIN 10 [USP'U]/ML
INJECTION, SOLUTION INTRAMUSCULAR; INTRAVENOUS PRN
Status: DISCONTINUED | OUTPATIENT
Start: 2022-12-03 | End: 2022-12-03

## 2022-12-03 RX ORDER — VITAMIN A, VITAMIN C, VITAMIN D-3, VITAMIN E, VITAMIN B-1, VITAMIN B-2, NIACIN, VITAMIN B-6, CALCIUM, IRON, ZINC, COPPER 4000; 120; 400; 22; 1.84; 3; 20; 10; 1; 12; 200; 27; 25; 2 [IU]/1; MG/1; [IU]/1; MG/1; MG/1; MG/1; MG/1; MG/1; MG/1; UG/1; MG/1; MG/1; MG/1; MG/1
1 TABLET ORAL DAILY
Status: DISCONTINUED | OUTPATIENT
Start: 2022-12-03 | End: 2022-12-05 | Stop reason: HOSPADM

## 2022-12-03 RX ORDER — ONDANSETRON 2 MG/ML
4 INJECTION INTRAMUSCULAR; INTRAVENOUS EVERY 12 HOURS PRN
Status: DISCONTINUED | OUTPATIENT
Start: 2022-12-03 | End: 2022-12-05 | Stop reason: HOSPADM

## 2022-12-03 RX ORDER — SIMETHICONE 125 MG
125 TABLET,CHEWABLE ORAL 4 TIMES DAILY PRN
Status: DISCONTINUED | OUTPATIENT
Start: 2022-12-03 | End: 2022-12-05 | Stop reason: HOSPADM

## 2022-12-03 RX ORDER — OXYCODONE HYDROCHLORIDE 5 MG/1
5 TABLET ORAL EVERY 4 HOURS PRN
Status: DISCONTINUED | OUTPATIENT
Start: 2022-12-03 | End: 2022-12-05 | Stop reason: HOSPADM

## 2022-12-03 RX ORDER — OXYTOCIN-SODIUM CHLORIDE 0.9% IV SOLN 30 UNIT/500ML 30-0.9/5 UT/ML-%
0-334 SOLUTION INTRAVENOUS CONTINUOUS
Status: DISCONTINUED | OUTPATIENT
Start: 2022-12-03 | End: 2022-12-05 | Stop reason: HOSPADM

## 2022-12-03 RX ORDER — KETOROLAC TROMETHAMINE 15 MG/ML
15 INJECTION, SOLUTION INTRAMUSCULAR; INTRAVENOUS EVERY 6 HOURS
Status: COMPLETED | OUTPATIENT
Start: 2022-12-03 | End: 2022-12-03

## 2022-12-03 RX ORDER — TRANEXAMIC ACID 10 MG/ML
1000 INJECTION, SOLUTION INTRAVENOUS
Status: DISCONTINUED | OUTPATIENT
Start: 2022-12-03 | End: 2022-12-03

## 2022-12-03 RX ORDER — IBUPROFEN 600 MG/1
600 TABLET ORAL EVERY 6 HOURS SCHEDULED
Status: DISCONTINUED | OUTPATIENT
Start: 2022-12-04 | End: 2022-12-05 | Stop reason: HOSPADM

## 2022-12-03 RX ORDER — ONDANSETRON 2 MG/ML
INJECTION INTRAMUSCULAR; INTRAVENOUS PRN
Status: DISCONTINUED | OUTPATIENT
Start: 2022-12-03 | End: 2022-12-03

## 2022-12-03 RX ORDER — AMOXICILLIN 250 MG
2 CAPSULE ORAL DAILY PRN
Status: DISCONTINUED | OUTPATIENT
Start: 2022-12-03 | End: 2022-12-04

## 2022-12-03 RX ORDER — PROCHLORPERAZINE MALEATE 5 MG/1
5 TABLET ORAL EVERY 4 HOURS PRN
Status: DISCONTINUED | OUTPATIENT
Start: 2022-12-03 | End: 2022-12-05 | Stop reason: HOSPADM

## 2022-12-03 RX ORDER — CLINDAMYCIN PHOSPHATE 900 MG/50ML
900 INJECTION INTRAVENOUS
Status: COMPLETED | OUTPATIENT
Start: 2022-12-03 | End: 2022-12-03

## 2022-12-03 RX ORDER — NALOXONE HCL 0.4 MG/ML
0.2 VIAL (ML) INJECTION EVERY 5 MIN PRN
Status: DISCONTINUED | OUTPATIENT
Start: 2022-12-03 | End: 2022-12-03

## 2022-12-03 RX ORDER — ACETAMINOPHEN 500 MG
1000 TABLET ORAL
Status: COMPLETED | OUTPATIENT
Start: 2022-12-03 | End: 2022-12-03

## 2022-12-03 RX ORDER — METOCLOPRAMIDE HYDROCHLORIDE 5 MG/ML
INJECTION INTRAMUSCULAR; INTRAVENOUS PRN
Status: DISCONTINUED | OUTPATIENT
Start: 2022-12-03 | End: 2022-12-03

## 2022-12-03 RX ORDER — SODIUM CHLORIDE, SODIUM LACTATE, POTASSIUM CHLORIDE, CALCIUM CHLORIDE 600; 310; 30; 20 MG/100ML; MG/100ML; MG/100ML; MG/100ML
INJECTION, SOLUTION INTRAVENOUS CONTINUOUS
Status: DISCONTINUED | OUTPATIENT
Start: 2022-12-03 | End: 2022-12-03

## 2022-12-03 RX ORDER — HYDROCORTISONE ACETATE 25 MG/1
25 SUPPOSITORY RECTAL EVERY 8 HOURS PRN
Status: DISCONTINUED | OUTPATIENT
Start: 2022-12-03 | End: 2022-12-05 | Stop reason: HOSPADM

## 2022-12-03 RX ORDER — SODIUM CHLORIDE, SODIUM LACTATE, POTASSIUM CHLORIDE, CALCIUM CHLORIDE 600; 310; 30; 20 MG/100ML; MG/100ML; MG/100ML; MG/100ML
INJECTION, SOLUTION INTRAVENOUS CONTINUOUS PRN
Status: DISCONTINUED | OUTPATIENT
Start: 2022-12-03 | End: 2022-12-03

## 2022-12-03 RX ORDER — LIDOCAINE HCL/EPINEPHRINE/PF 2%-1:200K
VIAL (ML) INJECTION PRN
Status: DISCONTINUED | OUTPATIENT
Start: 2022-12-03 | End: 2022-12-03

## 2022-12-03 RX ORDER — MORPHINE SULFATE 1 MG/ML
INJECTION, SOLUTION EPIDURAL; INTRATHECAL; INTRAVENOUS PRN
Status: DISCONTINUED | OUTPATIENT
Start: 2022-12-03 | End: 2022-12-03

## 2022-12-03 RX ORDER — BISACODYL 10 MG
10 SUPPOSITORY, RECTAL RECTAL DAILY PRN
Status: DISCONTINUED | OUTPATIENT
Start: 2022-12-03 | End: 2022-12-05 | Stop reason: HOSPADM

## 2022-12-03 RX ORDER — MISOPROSTOL 200 UG/1
1000 TABLET ORAL ONCE
Status: DISCONTINUED | OUTPATIENT
Start: 2022-12-03 | End: 2022-12-05 | Stop reason: HOSPADM

## 2022-12-03 RX ORDER — CARBOPROST TROMETHAMINE 250 UG/ML
250 INJECTION, SOLUTION INTRAMUSCULAR
Status: DISCONTINUED | OUTPATIENT
Start: 2022-12-03 | End: 2022-12-05 | Stop reason: HOSPADM

## 2022-12-03 RX ORDER — METHYLERGONOVINE MALEATE 0.2 MG/ML
200 INJECTION INTRAVENOUS
Status: DISCONTINUED | OUTPATIENT
Start: 2022-12-03 | End: 2022-12-05 | Stop reason: HOSPADM

## 2022-12-03 RX ORDER — CALCIUM CARBONATE 500 MG/1
500 TABLET, CHEWABLE ORAL EVERY 4 HOURS PRN
Status: DISCONTINUED | OUTPATIENT
Start: 2022-12-03 | End: 2022-12-05 | Stop reason: HOSPADM

## 2022-12-03 RX ADMIN — CLINDAMYCIN IN 5 PERCENT DEXTROSE 900 MG: 18 INJECTION, SOLUTION INTRAVENOUS at 03:57

## 2022-12-03 RX ADMIN — PIPERACILLIN AND TAZOBACTAM 3.38 G: 3; .375 INJECTION, POWDER, FOR SOLUTION INTRAVENOUS at 14:04

## 2022-12-03 RX ADMIN — AZITHROMYCIN MONOHYDRATE 500 MG: 500 INJECTION, POWDER, LYOPHILIZED, FOR SOLUTION INTRAVENOUS at 04:26

## 2022-12-03 RX ADMIN — LIDOCAINE HYDROCHLORIDE,EPINEPHRINE BITARTRATE 5 ML: 20; .005 INJECTION, SOLUTION EPIDURAL; INFILTRATION; INTRACAUDAL; PERINEURAL at 04:04

## 2022-12-03 RX ADMIN — PIPERACILLIN AND TAZOBACTAM 3.38 G: 3; .375 INJECTION, POWDER, FOR SOLUTION INTRAVENOUS at 22:01

## 2022-12-03 RX ADMIN — ONDANSETRON 4 MG: 2 INJECTION INTRAMUSCULAR; INTRAVENOUS at 04:44

## 2022-12-03 RX ADMIN — KETOROLAC TROMETHAMINE 15 MG: 15 INJECTION, SOLUTION INTRAMUSCULAR; INTRAVENOUS at 23:29

## 2022-12-03 RX ADMIN — ACETAMINOPHEN 1000 MG: 500 TABLET ORAL at 21:47

## 2022-12-03 RX ADMIN — KETOROLAC TROMETHAMINE 30 MG: 30 INJECTION, SOLUTION INTRAMUSCULAR at 05:07

## 2022-12-03 RX ADMIN — SODIUM CHLORIDE, POTASSIUM CHLORIDE, SODIUM LACTATE AND CALCIUM CHLORIDE: 600; 310; 30; 20 INJECTION, SOLUTION INTRAVENOUS at 03:48

## 2022-12-03 RX ADMIN — SODIUM CHLORIDE, POTASSIUM CHLORIDE, SODIUM LACTATE AND CALCIUM CHLORIDE: 600; 310; 30; 20 INJECTION, SOLUTION INTRAVENOUS at 05:18

## 2022-12-03 RX ADMIN — AMPICILLIN SODIUM 2000 MG: 2 INJECTION, POWDER, FOR SOLUTION INTRAVENOUS at 00:39

## 2022-12-03 RX ADMIN — SODIUM CHLORIDE: 9 INJECTION, SOLUTION INTRAVENOUS at 07:36

## 2022-12-03 RX ADMIN — OXYTOCIN 6 UNITS: 10 INJECTION, SOLUTION INTRAMUSCULAR; INTRAVENOUS at 04:42

## 2022-12-03 RX ADMIN — KETOROLAC TROMETHAMINE 15 MG: 15 INJECTION, SOLUTION INTRAMUSCULAR; INTRAVENOUS at 17:51

## 2022-12-03 RX ADMIN — OXYTOCIN 334 MILLI-UNITS/MIN: 10 INJECTION, SOLUTION INTRAMUSCULAR; INTRAVENOUS at 04:42

## 2022-12-03 RX ADMIN — LIDOCAINE HYDROCHLORIDE,EPINEPHRINE BITARTRATE 5 ML: 20; .005 INJECTION, SOLUTION EPIDURAL; INFILTRATION; INTRACAUDAL; PERINEURAL at 04:00

## 2022-12-03 RX ADMIN — MORPHINE SULFATE 2 MG: 1 INJECTION, SOLUTION EPIDURAL; INTRATHECAL; INTRAVENOUS at 04:33

## 2022-12-03 RX ADMIN — PIPERACILLIN AND TAZOBACTAM 3.38 G: 3; .375 INJECTION, POWDER, FOR SOLUTION INTRAVENOUS at 07:43

## 2022-12-03 RX ADMIN — KETOROLAC TROMETHAMINE 15 MG: 15 INJECTION, SOLUTION INTRAMUSCULAR; INTRAVENOUS at 11:35

## 2022-12-03 RX ADMIN — SODIUM CHLORIDE, POTASSIUM CHLORIDE, SODIUM LACTATE AND CALCIUM CHLORIDE: 600; 310; 30; 20 INJECTION, SOLUTION INTRAVENOUS at 14:43

## 2022-12-03 RX ADMIN — ACETAMINOPHEN 1000 MG: 500 TABLET ORAL at 14:01

## 2022-12-03 RX ADMIN — ACETAMINOPHEN 1000 MG: 500 TABLET ORAL at 06:56

## 2022-12-03 RX ADMIN — LIDOCAINE HYDROCHLORIDE,EPINEPHRINE BITARTRATE 10 ML: 20; .005 INJECTION, SOLUTION EPIDURAL; INFILTRATION; INTRACAUDAL; PERINEURAL at 03:51

## 2022-12-03 RX ADMIN — METOCLOPRAMIDE 5 MG: 5 INJECTION, SOLUTION INTRAMUSCULAR; INTRAVENOUS at 04:58

## 2022-12-03 RX ADMIN — GENTAMICIN SULFATE 320 MG: 40 INJECTION, SOLUTION INTRAMUSCULAR; INTRAVENOUS at 00:58

## 2022-12-03 RX ADMIN — DEXAMETHASONE SODIUM PHOSPHATE 4 MG: 4 INJECTION, SOLUTION INTRAMUSCULAR; INTRAVENOUS at 04:53

## 2022-12-03 ASSESSMENT — PAIN SCALES - GENERAL
PAINLEVEL_OUTOF10: 5
PAINLEVEL_OUTOF10: 6
PAINLEVEL_OUTOF10: 0
PAINLEVEL_OUTOF10: 1
PAINLEVEL_OUTOF10: 5
PAINLEVEL_OUTOF10: 0
PAINLEVEL_OUTOF10: 5

## 2022-12-04 LAB
HCT VFR BLD CALC: 32.6 % (ref 36–46.5)
HGB BLD-MCNC: 10.8 G/DL (ref 12–15.5)

## 2022-12-04 PROCEDURE — 85018 HEMOGLOBIN: CPT

## 2022-12-04 PROCEDURE — 10002803 HB RX 637: Performed by: OBSTETRICS & GYNECOLOGY

## 2022-12-04 PROCEDURE — 10004651 HB RX, NO CHARGE ITEM

## 2022-12-04 PROCEDURE — 10002803 HB RX 637

## 2022-12-04 PROCEDURE — 10000003 HB ROOM CHARGE WOMEN'S HEALTH

## 2022-12-04 PROCEDURE — 36415 COLL VENOUS BLD VENIPUNCTURE: CPT

## 2022-12-04 PROCEDURE — 99024 POSTOP FOLLOW-UP VISIT: CPT | Performed by: OBSTETRICS & GYNECOLOGY

## 2022-12-04 RX ORDER — POLYETHYLENE GLYCOL 3350 17 G/17G
17 POWDER, FOR SOLUTION ORAL NIGHTLY
Status: DISCONTINUED | OUTPATIENT
Start: 2022-12-04 | End: 2022-12-05 | Stop reason: HOSPADM

## 2022-12-04 RX ORDER — AMOXICILLIN 250 MG
2 CAPSULE ORAL 2 TIMES DAILY
Status: DISCONTINUED | OUTPATIENT
Start: 2022-12-04 | End: 2022-12-05 | Stop reason: HOSPADM

## 2022-12-04 RX ADMIN — VITAMIN A, VITAMIN C, VITAMIN D, VITAMIN E, THIAMINE, RIBOFLAVIN, NIACIN, VITAMIN B6, FOLIC ACID, VITAMIN B12, CALCIUM, IRON, ZINC, COPPER 1 TABLET: 4000; 120; 400; 22; 1.84; 3; 20; 10; 1; 12; 200; 27; 25; 2 TABLET ORAL at 11:21

## 2022-12-04 RX ADMIN — ACETAMINOPHEN 1000 MG: 500 TABLET ORAL at 05:59

## 2022-12-04 RX ADMIN — SENNOSIDES AND DOCUSATE SODIUM 2 TABLET: 50; 8.6 TABLET ORAL at 09:35

## 2022-12-04 RX ADMIN — SIMETHICONE 125 MG: 125 TABLET, CHEWABLE ORAL at 05:06

## 2022-12-04 RX ADMIN — OXYCODONE HYDROCHLORIDE 5 MG: 5 TABLET ORAL at 19:09

## 2022-12-04 RX ADMIN — ACETAMINOPHEN 1000 MG: 500 TABLET ORAL at 14:05

## 2022-12-04 RX ADMIN — IBUPROFEN 600 MG: 600 TABLET, FILM COATED ORAL at 11:21

## 2022-12-04 RX ADMIN — SIMETHICONE 125 MG: 125 TABLET, CHEWABLE ORAL at 14:05

## 2022-12-04 RX ADMIN — SIMETHICONE 125 MG: 125 TABLET, CHEWABLE ORAL at 09:35

## 2022-12-04 RX ADMIN — IBUPROFEN 600 MG: 600 TABLET, FILM COATED ORAL at 05:59

## 2022-12-04 RX ADMIN — IBUPROFEN 600 MG: 600 TABLET, FILM COATED ORAL at 17:53

## 2022-12-04 RX ADMIN — OXYCODONE HYDROCHLORIDE 5 MG: 5 TABLET ORAL at 05:05

## 2022-12-04 RX ADMIN — SENNOSIDES AND DOCUSATE SODIUM 2 TABLET: 50; 8.6 TABLET ORAL at 21:52

## 2022-12-04 RX ADMIN — SIMETHICONE 125 MG: 125 TABLET, CHEWABLE ORAL at 21:52

## 2022-12-04 RX ADMIN — ACETAMINOPHEN 1000 MG: 500 TABLET ORAL at 21:52

## 2022-12-04 ASSESSMENT — PAIN SCALES - GENERAL
PAINLEVEL_OUTOF10: 3
PAINLEVEL_OUTOF10: 3
PAINLEVEL_OUTOF10: 10
PAINLEVEL_OUTOF10: 3
PAINLEVEL_OUTOF10: 3
PAINLEVEL_OUTOF10: 7
PAINLEVEL_OUTOF10: 2
PAINLEVEL_OUTOF10: 8
PAINLEVEL_OUTOF10: 3
PAINLEVEL_OUTOF10: 0
PAINLEVEL_OUTOF10: 0
PAINLEVEL_OUTOF10: 7

## 2022-12-05 VITALS
HEIGHT: 64 IN | SYSTOLIC BLOOD PRESSURE: 122 MMHG | HEART RATE: 69 BPM | WEIGHT: 174.16 LBS | OXYGEN SATURATION: 97 % | TEMPERATURE: 98.1 F | BODY MASS INDEX: 29.73 KG/M2 | DIASTOLIC BLOOD PRESSURE: 82 MMHG | RESPIRATION RATE: 16 BRPM

## 2022-12-05 PROCEDURE — 10002803 HB RX 637: Performed by: OBSTETRICS & GYNECOLOGY

## 2022-12-05 PROCEDURE — 99024 POSTOP FOLLOW-UP VISIT: CPT | Performed by: OBSTETRICS & GYNECOLOGY

## 2022-12-05 PROCEDURE — 10004651 HB RX, NO CHARGE ITEM

## 2022-12-05 PROCEDURE — 10002803 HB RX 637

## 2022-12-05 PROCEDURE — 99232 SBSQ HOSP IP/OBS MODERATE 35: CPT | Performed by: OBSTETRICS & GYNECOLOGY

## 2022-12-05 RX ORDER — ACETAMINOPHEN 500 MG
1000 TABLET ORAL EVERY 8 HOURS SCHEDULED
Qty: 60 TABLET | Refills: 0 | Status: SHIPPED | OUTPATIENT
Start: 2022-12-05

## 2022-12-05 RX ORDER — IBUPROFEN 600 MG/1
600 TABLET ORAL EVERY 6 HOURS SCHEDULED
Qty: 60 TABLET | Refills: 0 | Status: SHIPPED | OUTPATIENT
Start: 2022-12-05

## 2022-12-05 RX ORDER — POLYETHYLENE GLYCOL 3350 17 G/17G
17 POWDER, FOR SOLUTION ORAL NIGHTLY
Qty: 30 PACKET | Refills: 0 | Status: SHIPPED | OUTPATIENT
Start: 2022-12-05

## 2022-12-05 RX ORDER — OXYCODONE HYDROCHLORIDE 5 MG/1
5 TABLET ORAL EVERY 4 HOURS PRN
Qty: 10 TABLET | Refills: 0 | Status: SHIPPED | OUTPATIENT
Start: 2022-12-05

## 2022-12-05 RX ORDER — AMOXICILLIN 250 MG
2 CAPSULE ORAL 2 TIMES DAILY
Qty: 60 TABLET | Refills: 0 | Status: SHIPPED | OUTPATIENT
Start: 2022-12-05

## 2022-12-05 RX ADMIN — IBUPROFEN 600 MG: 600 TABLET, FILM COATED ORAL at 06:18

## 2022-12-05 RX ADMIN — SENNOSIDES AND DOCUSATE SODIUM 2 TABLET: 50; 8.6 TABLET ORAL at 11:54

## 2022-12-05 RX ADMIN — ACETAMINOPHEN 1000 MG: 500 TABLET ORAL at 06:18

## 2022-12-05 RX ADMIN — IBUPROFEN 600 MG: 600 TABLET, FILM COATED ORAL at 00:02

## 2022-12-05 RX ADMIN — IBUPROFEN 600 MG: 600 TABLET, FILM COATED ORAL at 11:54

## 2022-12-05 RX ADMIN — OXYCODONE HYDROCHLORIDE 5 MG: 5 TABLET ORAL at 02:53

## 2022-12-05 RX ADMIN — VITAMIN A, VITAMIN C, VITAMIN D, VITAMIN E, THIAMINE, RIBOFLAVIN, NIACIN, VITAMIN B6, FOLIC ACID, VITAMIN B12, CALCIUM, IRON, ZINC, COPPER 1 TABLET: 4000; 120; 400; 22; 1.84; 3; 20; 10; 1; 12; 200; 27; 25; 2 TABLET ORAL at 11:52

## 2022-12-05 RX ADMIN — SIMETHICONE 125 MG: 125 TABLET, CHEWABLE ORAL at 06:18

## 2022-12-05 RX ADMIN — ACETAMINOPHEN 1000 MG: 500 TABLET ORAL at 14:18

## 2022-12-05 ASSESSMENT — PAIN SCALES - GENERAL
PAINLEVEL_OUTOF10: 6
PAINLEVEL_OUTOF10: 2
PAINLEVEL_OUTOF10: 3
PAINLEVEL_OUTOF10: 0
PAINLEVEL_OUTOF10: 3
PAINLEVEL_OUTOF10: 0

## 2022-12-22 ENCOUNTER — OFFICE VISIT (OUTPATIENT)
Dept: OBGYN | Age: 27
End: 2022-12-22

## 2022-12-22 VITALS
DIASTOLIC BLOOD PRESSURE: 69 MMHG | HEART RATE: 112 BPM | BODY MASS INDEX: 27.63 KG/M2 | SYSTOLIC BLOOD PRESSURE: 110 MMHG | HEIGHT: 62 IN | WEIGHT: 150.13 LBS | OXYGEN SATURATION: 96 %

## 2022-12-22 DIAGNOSIS — Z98.890 POST-OPERATIVE STATE: Primary | ICD-10-CM

## 2022-12-22 PROCEDURE — 99024 POSTOP FOLLOW-UP VISIT: CPT | Performed by: STUDENT IN AN ORGANIZED HEALTH CARE EDUCATION/TRAINING PROGRAM

## 2022-12-22 RX ORDER — PNV NO.95/FERROUS FUM/FOLIC AC 28MG-0.8MG
1 TABLET ORAL DAILY
COMMUNITY
Start: 2022-12-05

## 2023-01-23 ENCOUNTER — POSTPARTUM VISIT (OUTPATIENT)
Dept: OBGYN | Age: 28
End: 2023-01-23

## 2023-01-23 VITALS
SYSTOLIC BLOOD PRESSURE: 117 MMHG | HEART RATE: 80 BPM | HEIGHT: 62 IN | BODY MASS INDEX: 27.69 KG/M2 | DIASTOLIC BLOOD PRESSURE: 74 MMHG | WEIGHT: 150.46 LBS | OXYGEN SATURATION: 97 %

## 2023-01-23 PROCEDURE — 88175 CYTOPATH C/V AUTO FLUID REDO: CPT | Performed by: INTERNAL MEDICINE

## 2023-01-23 PROCEDURE — 0503F POSTPARTUM CARE VISIT: CPT | Performed by: STUDENT IN AN ORGANIZED HEALTH CARE EDUCATION/TRAINING PROGRAM

## 2023-01-25 ENCOUNTER — TELEPHONE (OUTPATIENT)
Dept: OBGYN | Age: 28
End: 2023-01-25

## 2023-01-25 LAB — HOLD SPECIMEN: NORMAL

## 2023-01-30 LAB
CASE RPRT: NORMAL
CLINICAL INFO: NORMAL
CYTOLOGY CVX/VAG STUDY: NORMAL
PAP EDUCATIONAL NOTE: NORMAL
SPECIMEN ADEQUACY: NORMAL

## 2023-08-09 ENCOUNTER — E-ADVICE (OUTPATIENT)
Dept: OBGYN | Age: 28
End: 2023-08-09

## 2023-10-01 ENCOUNTER — APPOINTMENT (OUTPATIENT)
Dept: GENERAL RADIOLOGY | Age: 28
End: 2023-10-01
Attending: EMERGENCY MEDICINE

## 2023-10-01 ENCOUNTER — HOSPITAL ENCOUNTER (EMERGENCY)
Age: 28
Discharge: HOME | End: 2023-10-01
Attending: EMERGENCY MEDICINE

## 2023-10-01 ENCOUNTER — HOSPITAL ENCOUNTER (EMERGENCY)
Age: 28
Discharge: STILL ADMITTED OR ADMINISTRATIVE | End: 2023-10-01

## 2023-10-01 VITALS
DIASTOLIC BLOOD PRESSURE: 66 MMHG | TEMPERATURE: 98.2 F | OXYGEN SATURATION: 99 % | SYSTOLIC BLOOD PRESSURE: 97 MMHG | RESPIRATION RATE: 18 BRPM | HEART RATE: 76 BPM

## 2023-10-01 DIAGNOSIS — R05.1 ACUTE COUGH: Primary | ICD-10-CM

## 2023-10-01 LAB
ALBUMIN SERPL-MCNC: 3.7 G/DL (ref 3.6–5.1)
ALBUMIN/GLOB SERPL: 0.8 {RATIO} (ref 1–2.4)
ALP SERPL-CCNC: 58 UNITS/L (ref 45–117)
ALT SERPL-CCNC: 19 UNITS/L
ANION GAP SERPL CALC-SCNC: 5 MMOL/L (ref 7–19)
AST SERPL-CCNC: 16 UNITS/L
ATRIAL RATE (BPM): 72
BASOPHILS # BLD: 0 K/MCL (ref 0–0.3)
BASOPHILS NFR BLD: 0 %
BILIRUB SERPL-MCNC: 0.2 MG/DL (ref 0.2–1)
BUN SERPL-MCNC: 11 MG/DL (ref 6–20)
BUN/CREAT SERPL: 18 (ref 7–25)
CALCIUM SERPL-MCNC: 9.1 MG/DL (ref 8.4–10.2)
CHLORIDE SERPL-SCNC: 108 MMOL/L (ref 97–110)
CO2 SERPL-SCNC: 30 MMOL/L (ref 21–32)
CREAT SERPL-MCNC: 0.61 MG/DL (ref 0.51–0.95)
DEPRECATED RDW RBC: 40.9 FL (ref 39–50)
EGFRCR SERPLBLD CKD-EPI 2021: >90 ML/MIN/{1.73_M2}
EOSINOPHIL # BLD: 0.1 K/MCL (ref 0–0.5)
EOSINOPHIL NFR BLD: 1 %
ERYTHROCYTE [DISTWIDTH] IN BLOOD: 12.4 % (ref 11–15)
FASTING DURATION TIME PATIENT: ABNORMAL H
FLUAV RNA RESP QL NAA+PROBE: NOT DETECTED
FLUBV RNA RESP QL NAA+PROBE: NOT DETECTED
GLOBULIN SER-MCNC: 4.4 G/DL (ref 2–4)
GLUCOSE SERPL-MCNC: 91 MG/DL (ref 70–99)
HCT VFR BLD CALC: 39 % (ref 36–46.5)
HGB BLD-MCNC: 12.7 G/DL (ref 12–15.5)
IMM GRANULOCYTES # BLD AUTO: 0 K/MCL (ref 0–0.2)
IMM GRANULOCYTES # BLD: 0 %
LYMPHOCYTES # BLD: 2.9 K/MCL (ref 1–4.8)
LYMPHOCYTES NFR BLD: 47 %
MCH RBC QN AUTO: 29.1 PG (ref 26–34)
MCHC RBC AUTO-ENTMCNC: 32.6 G/DL (ref 32–36.5)
MCV RBC AUTO: 89.4 FL (ref 78–100)
MONOCYTES # BLD: 0.5 K/MCL (ref 0.3–0.9)
MONOCYTES NFR BLD: 8 %
NEUTROPHILS # BLD: 2.7 K/MCL (ref 1.8–7.7)
NEUTROPHILS NFR BLD: 44 %
NRBC BLD MANUAL-RTO: 0 /100 WBC
P AXIS (DEGREES): 69
PLATELET # BLD AUTO: 208 K/MCL (ref 140–450)
POTASSIUM SERPL-SCNC: 4 MMOL/L (ref 3.4–5.1)
PR-INTERVAL (MSEC): 149
PROT SERPL-MCNC: 8.1 G/DL (ref 6.4–8.2)
QRS-INTERVAL (MSEC): 73
QT-INTERVAL (MSEC): 381
QTC: 417
R AXIS (DEGREES): 85
RBC # BLD: 4.36 MIL/MCL (ref 4–5.2)
REPORT TEXT: NORMAL
RSV AG NPH QL IA.RAPID: NOT DETECTED
SARS-COV-2 RNA RESP QL NAA+PROBE: NOT DETECTED
SERVICE CMNT-IMP: NORMAL
SERVICE CMNT-IMP: NORMAL
SODIUM SERPL-SCNC: 139 MMOL/L (ref 135–145)
T AXIS (DEGREES): 55
TROPONIN I SERPL DL<=0.01 NG/ML-MCNC: <4 NG/L
VENTRICULAR RATE EKG/MIN (BPM): 72
WBC # BLD: 6.2 K/MCL (ref 4.2–11)

## 2023-10-01 PROCEDURE — 0241U COVID/FLU/RSV PANEL: CPT | Performed by: EMERGENCY MEDICINE

## 2023-10-01 PROCEDURE — 80053 COMPREHEN METABOLIC PANEL: CPT | Performed by: EMERGENCY MEDICINE

## 2023-10-01 PROCEDURE — 93005 ELECTROCARDIOGRAM TRACING: CPT | Performed by: EMERGENCY MEDICINE

## 2023-10-01 PROCEDURE — 85025 COMPLETE CBC W/AUTO DIFF WBC: CPT | Performed by: EMERGENCY MEDICINE

## 2023-10-01 PROCEDURE — 71046 X-RAY EXAM CHEST 2 VIEWS: CPT

## 2023-10-01 PROCEDURE — 99285 EMERGENCY DEPT VISIT HI MDM: CPT

## 2023-10-01 PROCEDURE — C9803 HOPD COVID-19 SPEC COLLECT: HCPCS

## 2023-10-01 PROCEDURE — 84484 ASSAY OF TROPONIN QUANT: CPT | Performed by: EMERGENCY MEDICINE

## 2023-10-01 PROCEDURE — 99283 EMERGENCY DEPT VISIT LOW MDM: CPT | Performed by: EMERGENCY MEDICINE

## 2023-10-01 PROCEDURE — 36415 COLL VENOUS BLD VENIPUNCTURE: CPT

## 2023-10-01 RX ORDER — ALBUTEROL SULFATE 90 UG/1
2 AEROSOL, METERED RESPIRATORY (INHALATION) EVERY 4 HOURS PRN
Qty: 8.5 G | Refills: 0 | Status: SHIPPED | OUTPATIENT
Start: 2023-10-01 | End: 2023-10-01 | Stop reason: SDUPTHER

## 2023-10-01 RX ORDER — BENZONATATE 100 MG/1
100 CAPSULE ORAL 3 TIMES DAILY PRN
Qty: 15 CAPSULE | Refills: 0 | Status: SHIPPED | OUTPATIENT
Start: 2023-10-01

## 2023-10-01 RX ORDER — ALBUTEROL SULFATE 90 UG/1
2 AEROSOL, METERED RESPIRATORY (INHALATION) EVERY 4 HOURS PRN
Qty: 8.5 G | Refills: 0 | Status: SHIPPED | OUTPATIENT
Start: 2023-10-01

## 2023-10-01 RX ORDER — BENZONATATE 100 MG/1
100 CAPSULE ORAL 3 TIMES DAILY PRN
Qty: 15 CAPSULE | Refills: 0 | Status: SHIPPED | OUTPATIENT
Start: 2023-10-01 | End: 2023-10-01 | Stop reason: SDUPTHER

## 2023-10-01 ASSESSMENT — PAIN DESCRIPTION - PAIN TYPE: TYPE: CHEST PAIN

## 2023-10-01 ASSESSMENT — PAIN SCALES - GENERAL: PAINLEVEL_OUTOF10: 5

## 2023-10-24 ENCOUNTER — NURSE TRIAGE (OUTPATIENT)
Dept: TELEHEALTH | Age: 28
End: 2023-10-24

## 2023-12-05 ENCOUNTER — APPOINTMENT (OUTPATIENT)
Dept: FAMILY MEDICINE | Age: 28
End: 2023-12-05

## 2024-04-23 ENCOUNTER — TELEPHONE (OUTPATIENT)
Dept: OBGYN | Age: 29
End: 2024-04-23

## 2024-04-23 ENCOUNTER — APPOINTMENT (OUTPATIENT)
Dept: OBGYN | Age: 29
End: 2024-04-23

## 2024-04-23 DIAGNOSIS — Z3A.09 9 WEEKS GESTATION OF PREGNANCY: Primary | ICD-10-CM

## 2024-04-23 DIAGNOSIS — Z3A.08 8 WEEKS GESTATION OF PREGNANCY: Primary | ICD-10-CM

## 2024-04-23 SDOH — HEALTH STABILITY: MENTAL HEALTH: HOW OFTEN DO YOU HAVE A DRINK CONTAINING ALCOHOL?: NEVER

## 2024-04-23 SDOH — HEALTH STABILITY: MENTAL HEALTH: DEPRESSION SCREENING SCORE: 0

## 2024-04-23 SDOH — HEALTH STABILITY: MENTAL HEALTH: HOW OFTEN DO YOU HAVE 6 OR MORE DRINKS ON ONE OCCASION?: NEVER

## 2024-04-23 SDOH — SOCIAL STABILITY: SOCIAL INSECURITY: HOW OFTEN DOES ANYONE, INCLUDING FAMILY AND FRIENDS, THREATEN YOU WITH HARM?: NEVER

## 2024-04-23 SDOH — HEALTH STABILITY: MENTAL HEALTH: LITTLE INTEREST OR PLEASURE IN ACTIVITY?: NOT AT ALL

## 2024-04-23 SDOH — SOCIAL STABILITY: SOCIAL INSECURITY: HOW OFTEN DOES ANYONE, INCLUDING FAMILY AND FRIENDS, PHYSICALLY HURT YOU?: NEVER

## 2024-04-23 SDOH — HEALTH STABILITY: MENTAL HEALTH: PHQ2 INTERPRETATION: NO FURTHER SCREENING NEEDED

## 2024-04-23 SDOH — ECONOMIC STABILITY: HOUSING INSECURITY: WHAT IS YOUR LIVING SITUATION TODAY?: I HAVE A STEADY PLACE TO LIVE

## 2024-04-23 SDOH — ECONOMIC STABILITY: FOOD INSECURITY: WITHIN THE PAST 12 MONTHS, THE FOOD YOU BOUGHT JUST DIDN'T LAST AND YOU DIDN'T HAVE MONEY TO GET MORE.: NEVER TRUE

## 2024-04-23 SDOH — ECONOMIC STABILITY: HOUSING INSECURITY: DO YOU HAVE PROBLEMS WITH ANY OF THE FOLLOWING?: NONE OF THE ABOVE

## 2024-04-23 SDOH — HEALTH STABILITY: PHYSICAL HEALTH: ON AVERAGE, HOW MANY DAYS PER WEEK DO YOU ENGAGE IN MODERATE TO STRENUOUS EXERCISE (LIKE A BRISK WALK)?: 2 DAYS

## 2024-04-23 SDOH — ECONOMIC STABILITY: TRANSPORTATION INSECURITY
IN THE PAST 12 MONTHS, HAS LACK OF RELIABLE TRANSPORTATION KEPT YOU FROM MEDICAL APPOINTMENTS, MEETINGS, WORK OR FROM GETTING THINGS NEEDED FOR DAILY LIVING?: NO

## 2024-04-23 SDOH — SOCIAL STABILITY: SOCIAL INSECURITY: HOW OFTEN DOES ANYONE, INCLUDING FAMILY AND FRIENDS, SCREAM OR CURSE AT YOU?: NEVER

## 2024-04-23 SDOH — HEALTH STABILITY: MENTAL HEALTH: FEELING DOWN, DEPRESSED OR HOPELESS?: NOT AT ALL

## 2024-04-23 SDOH — ECONOMIC STABILITY: GENERAL

## 2024-04-23 SDOH — ECONOMIC STABILITY: INCOME INSECURITY: IN THE PAST 12 MONTHS, HAS THE ELECTRIC, GAS, OIL, OR WATER COMPANY THREATENED TO SHUT OFF SERVICE IN YOUR HOME?: NO

## 2024-04-23 SDOH — HEALTH STABILITY: PHYSICAL HEALTH: ON AVERAGE, HOW MANY MINUTES DO YOU ENGAGE IN EXERCISE AT THIS LEVEL?: 30 MIN

## 2024-04-23 SDOH — HEALTH STABILITY: MENTAL HEALTH: AUDIT TOTAL SCORE: 0

## 2024-04-23 SDOH — SOCIAL STABILITY: SOCIAL INSECURITY: HOW OFTEN DOES ANYONE, INCLUDING FAMILY AND FRIENDS, INSULT OR TALK DOWN TO YOU?: NEVER

## 2024-04-23 SDOH — SOCIAL STABILITY: SOCIAL NETWORK
HOW OFTEN DO YOU SEE OR TALK TO PEOPLE THAT YOU CARE ABOUT AND FEEL CLOSE TO? (FOR EXAMPLE: TALKING TO FRIENDS ON THE PHONE, VISITING FRIENDS OR FAMILY, GOING TO CHURCH OR CLUB MEETINGS): 1 OR 2 TIMES A WEEK

## 2024-04-23 SDOH — HEALTH STABILITY: MENTAL HEALTH: HOW MANY STANDARD DRINKS CONTAINING ALCOHOL DO YOU HAVE ON A TYPICAL DAY?: 0,1 OR 2

## 2024-04-23 ASSESSMENT — PATIENT HEALTH QUESTIONNAIRE - PHQ9: SUM OF ALL RESPONSES TO PHQ9 QUESTIONS 1 AND 2: 0

## 2024-04-23 ASSESSMENT — LIFESTYLE VARIABLES
DID ANY OF YOUR PARENTS HAVE PROBLEMS WITH ALCOHOL OR DRUG USE?: NO
DO ANY OF YOUR FRIENDS (PEERS) HAVE PROBLEMS WITH ALCOHOL OR DRUG USE?: NO
DOES YOUR PARTNER HAVE A PROBLEM WITH ALCOHOL OR DRUG USE?: NO
BEFORE YOU WERE PREGNANT DID YOU HAVE A PROBLEM WITH ALCOHOL OR DRUG USE? (PAST): NO
IN THE PAST MONTH, DID YOU DRINK BEER, WINE OR LIQUOR, OR USE OTHER DRUGS? (PREGNANCY): NO

## 2024-04-29 PROBLEM — Z34.90 PREGNANCY (CMD): Status: ACTIVE | Noted: 2024-04-29

## 2024-04-29 PROBLEM — Z34.90 PREGNANCY (CMD): Status: RESOLVED | Noted: 2022-07-21 | Resolved: 2024-04-29

## 2024-04-29 PROBLEM — R73.09 GTT (GLUCOSE TOLERANCE TEST) ABNORMAL: Status: RESOLVED | Noted: 2022-08-24 | Resolved: 2024-04-29

## 2024-04-30 ENCOUNTER — FIRST OB VISIT (OUTPATIENT)
Dept: OBGYN | Age: 29
End: 2024-04-30

## 2024-04-30 ENCOUNTER — APPOINTMENT (OUTPATIENT)
Dept: OBGYN | Age: 29
End: 2024-04-30

## 2024-04-30 VITALS
HEART RATE: 77 BPM | SYSTOLIC BLOOD PRESSURE: 97 MMHG | DIASTOLIC BLOOD PRESSURE: 61 MMHG | WEIGHT: 130.73 LBS | BODY MASS INDEX: 23.91 KG/M2 | OXYGEN SATURATION: 100 %

## 2024-04-30 DIAGNOSIS — Z3A.09 9 WEEKS GESTATION OF PREGNANCY (CMD): ICD-10-CM

## 2024-04-30 DIAGNOSIS — Z3A.01 7 WEEKS GESTATION OF PREGNANCY (CMD): ICD-10-CM

## 2024-04-30 DIAGNOSIS — O99.891 BACK PAIN AFFECTING PREGNANCY, ANTEPARTUM (CMD): ICD-10-CM

## 2024-04-30 DIAGNOSIS — Z3A.09 9 WEEKS GESTATION OF PREGNANCY (CMD): Primary | ICD-10-CM

## 2024-04-30 DIAGNOSIS — M54.9 BACK PAIN AFFECTING PREGNANCY, ANTEPARTUM (CMD): ICD-10-CM

## 2024-04-30 RX ORDER — PNV NO.95/FERROUS FUM/FOLIC AC 28MG-0.8MG
1 TABLET ORAL DAILY
Qty: 30 TABLET | Refills: 3 | Status: SHIPPED | OUTPATIENT
Start: 2024-04-30

## 2024-04-30 ASSESSMENT — PAIN SCALES - GENERAL: PAINLEVEL: 0

## 2024-05-01 LAB
ABO + RH BLD: NORMAL
BASOPHILS # BLD: 0 K/MCL (ref 0–0.3)
BASOPHILS NFR BLD: 0 %
BLD GP AB SCN SERPL QL GEL: NEGATIVE
C TRACH RRNA SPEC QL NAA+PROBE: NEGATIVE
DEPRECATED RDW RBC: 42.5 FL (ref 39–50)
EOSINOPHIL # BLD: 0 K/MCL (ref 0–0.5)
EOSINOPHIL NFR BLD: 1 %
ERYTHROCYTE [DISTWIDTH] IN BLOOD: 12.9 % (ref 11–15)
HBV SURFACE AG SER QL: NEGATIVE
HCT VFR BLD CALC: 35.9 % (ref 36–46.5)
HCV AB SER QL: NEGATIVE
HGB A1 MFR BLD ELPH: 97.3 % (ref 96.4–98.2)
HGB A2 MFR BLD ELPH: 2.7 % (ref 1.8–3.4)
HGB BLD-MCNC: 11.7 G/DL (ref 12–15.5)
HGB FRACT BLD ELPH-IMP: NORMAL
HIV 1+2 AB+HIV1 P24 AG SERPL QL IA: NONREACTIVE
IMM GRANULOCYTES # BLD AUTO: 0 K/MCL (ref 0–0.2)
IMM GRANULOCYTES # BLD: 0 %
LYMPHOCYTES # BLD: 2.1 K/MCL (ref 1–4.8)
LYMPHOCYTES NFR BLD: 28 %
Lab: NORMAL
MCH RBC QN AUTO: 29.5 PG (ref 26–34)
MCHC RBC AUTO-ENTMCNC: 32.6 G/DL (ref 32–36.5)
MCV RBC AUTO: 90.7 FL (ref 78–100)
MONOCYTES # BLD: 0.4 K/MCL (ref 0.3–0.9)
MONOCYTES NFR BLD: 6 %
N GONORRHOEA RRNA SPEC QL NAA+PROBE: NEGATIVE
NEUTROPHILS # BLD: 4.9 K/MCL (ref 1.8–7.7)
NEUTROPHILS NFR BLD: 65 %
NRBC BLD MANUAL-RTO: 0 /100 WBC
PLATELET # BLD AUTO: 231 K/MCL (ref 140–450)
RBC # BLD: 3.96 MIL/MCL (ref 4–5.2)
RPR SER QL: NONREACTIVE
RUBV IGG SERPL IA-ACNC: 31.7 UNITS/ML
VZV IGG SER IA-ACNC: 559.5 INDEX
VZV IGG SER QL IA: NORMAL
WBC # BLD: 7.5 K/MCL (ref 4.2–11)

## 2024-05-02 LAB — BACTERIA UR CULT: NORMAL

## 2024-06-04 ENCOUNTER — APPOINTMENT (OUTPATIENT)
Dept: OBGYN | Age: 29
End: 2024-06-04

## 2024-06-04 VITALS
DIASTOLIC BLOOD PRESSURE: 62 MMHG | BODY MASS INDEX: 22.13 KG/M2 | HEIGHT: 64 IN | HEART RATE: 91 BPM | WEIGHT: 129.63 LBS | OXYGEN SATURATION: 100 % | SYSTOLIC BLOOD PRESSURE: 97 MMHG

## 2024-06-04 DIAGNOSIS — Z3A.12 12 WEEKS GESTATION OF PREGNANCY (CMD): Primary | ICD-10-CM

## 2024-06-04 DIAGNOSIS — Z3A.01 7 WEEKS GESTATION OF PREGNANCY (CMD): ICD-10-CM

## 2024-07-02 ENCOUNTER — APPOINTMENT (OUTPATIENT)
Dept: OBGYN | Age: 29
End: 2024-07-02

## 2024-07-09 ENCOUNTER — EXTERNAL LAB (OUTPATIENT)
Dept: HEALTH INFORMATION MANAGEMENT | Age: 29
End: 2024-07-09

## 2024-07-09 ENCOUNTER — APPOINTMENT (OUTPATIENT)
Dept: OBGYN | Age: 29
End: 2024-07-09

## 2024-07-09 VITALS
WEIGHT: 137.8 LBS | OXYGEN SATURATION: 100 % | HEART RATE: 91 BPM | HEIGHT: 64 IN | SYSTOLIC BLOOD PRESSURE: 96 MMHG | BODY MASS INDEX: 23.52 KG/M2 | DIASTOLIC BLOOD PRESSURE: 65 MMHG

## 2024-07-09 DIAGNOSIS — Z3A.16 16 WEEKS GESTATION OF PREGNANCY (CMD): Primary | ICD-10-CM

## 2024-07-09 LAB — HORIZON CARRIER SCREEN SUMMARY: NEGATIVE

## 2024-07-09 PROCEDURE — 82105 ALPHA-FETOPROTEIN SERUM: CPT | Performed by: CLINICAL MEDICAL LABORATORY

## 2024-07-10 LAB
# FETUSES US: NORMAL
AFP MOM SERPL: 0.69 MOM
AFP SERPL-MCNC: 29.7 NG/ML
AGE AT DELIVERY: 27.68
GA: NORMAL WK
HX OF TRISOMY 21 NARR: NO
IDDM PATIENT QL: NO
METHOD BEST OVERALL GA ESTIMATE: NORMAL
NEURAL TUBE DEFECT RISK FETUS: NORMAL %
SERVICE CMNT-IMP: NORMAL
SERVICE CMNT-IMP: NORMAL
TS 21 RISK FETUS: NORMAL

## 2024-07-19 LAB
CFTR MUT ANL BLD/T: NEGATIVE
DMD GENE MUT ANL BLD/T: NEGATIVE
FRAGILE X PROTEIN (FMRP) BLD-IMP: NEGATIVE
NIPT COMMENT: NORMAL
NIPT OVERALL INTERPRETATION QL: NEGATIVE
PANEL NOTES: NORMAL
SMN1 GENE MUT ANL BLD/T: NEGATIVE
TEST PERFORMANCE INFO SPEC: NORMAL

## 2024-07-28 SDOH — ECONOMIC STABILITY: HOUSING INSECURITY: WHAT IS YOUR LIVING SITUATION TODAY?: I HAVE A STEADY PLACE TO LIVE

## 2024-07-28 SDOH — HEALTH STABILITY: PHYSICAL HEALTH: ON AVERAGE, HOW MANY MINUTES DO YOU ENGAGE IN EXERCISE AT THIS LEVEL?: 30 MIN

## 2024-07-28 SDOH — ECONOMIC STABILITY: HOUSING INSECURITY: DO YOU HAVE PROBLEMS WITH ANY OF THE FOLLOWING?: NONE OF THE ABOVE

## 2024-07-28 SDOH — ECONOMIC STABILITY: FOOD INSECURITY: WITHIN THE PAST 12 MONTHS, THE FOOD YOU BOUGHT JUST DIDN'T LAST AND YOU DIDN'T HAVE MONEY TO GET MORE.: NEVER TRUE

## 2024-07-28 SDOH — ECONOMIC STABILITY: GENERAL
IN THE PAST YEAR, HAVE YOU OR ANY FAMILY MEMBERS YOU LIVE WITH BEEN UNABLE TO GET ANY OF THE FOLLOWING WHEN IT WAS REALLY NEEDED? CHECK ALL THAT APPLY.: NONE;PATIENT DECLINED

## 2024-07-28 SDOH — HEALTH STABILITY: PHYSICAL HEALTH: ON AVERAGE, HOW MANY DAYS PER WEEK DO YOU ENGAGE IN MODERATE TO STRENUOUS EXERCISE (LIKE A BRISK WALK)?: 7 DAYS

## 2024-07-28 ASSESSMENT — LIFESTYLE VARIABLES
HOW OFTEN DO YOU HAVE A DRINK CONTAINING ALCOHOL: NEVER
HOW MANY STANDARD DRINKS CONTAINING ALCOHOL DO YOU HAVE ON A TYPICAL DAY: 0,1 OR 2
AUDIT-C TOTAL SCORE: 0
AUDIT-C TOTAL SCORE: 0
HOW OFTEN DO YOU HAVE A DRINK CONTAINING ALCOHOL: NEVER
HOW MANY STANDARD DRINKS CONTAINING ALCOHOL DO YOU HAVE ON A TYPICAL DAY: 0,1 OR 2

## 2024-07-28 ASSESSMENT — SOCIAL DETERMINANTS OF HEALTH (SDOH)
IN THE PAST 12 MONTHS, HAS THE ELECTRIC, GAS, OIL, OR WATER COMPANY THREATENED TO SHUT OFF SERVICE IN YOUR HOME?: NO
IN THE PAST 12 MONTHS, HAS THE ELECTRIC, GAS, OIL, OR WATER COMPANY THREATENED TO SHUT OFF SERVICE IN YOUR HOME?: NO

## 2024-07-30 ENCOUNTER — APPOINTMENT (OUTPATIENT)
Dept: OBGYN | Age: 29
End: 2024-07-30

## 2024-07-30 VITALS
WEIGHT: 140 LBS | DIASTOLIC BLOOD PRESSURE: 60 MMHG | SYSTOLIC BLOOD PRESSURE: 99 MMHG | BODY MASS INDEX: 23.9 KG/M2 | HEIGHT: 64 IN | HEART RATE: 85 BPM

## 2024-07-30 DIAGNOSIS — Z3A.16 16 WEEKS GESTATION OF PREGNANCY (CMD): ICD-10-CM

## 2024-07-30 DIAGNOSIS — J30.2 SEASONAL ALLERGIES: ICD-10-CM

## 2024-07-30 DIAGNOSIS — Z3A.20 20 WEEKS GESTATION OF PREGNANCY (CMD): Primary | ICD-10-CM

## 2024-07-30 RX ORDER — LORATADINE 10 MG/1
10 TABLET ORAL DAILY
Qty: 90 TABLET | Refills: 1 | Status: SHIPPED | OUTPATIENT
Start: 2024-07-30 | End: 2025-01-26

## 2024-08-27 ENCOUNTER — APPOINTMENT (OUTPATIENT)
Dept: OBGYN | Age: 29
End: 2024-08-27

## 2024-08-29 ENCOUNTER — APPOINTMENT (OUTPATIENT)
Dept: OBGYN | Age: 29
End: 2024-08-29

## 2024-08-29 ENCOUNTER — OB CHECK (OUTPATIENT)
Dept: OBGYN | Age: 29
End: 2024-08-29

## 2024-08-29 VITALS
DIASTOLIC BLOOD PRESSURE: 60 MMHG | HEIGHT: 64 IN | BODY MASS INDEX: 25.27 KG/M2 | HEART RATE: 80 BPM | WEIGHT: 148 LBS | SYSTOLIC BLOOD PRESSURE: 96 MMHG | OXYGEN SATURATION: 100 %

## 2024-08-29 DIAGNOSIS — Z3A.20 20 WEEKS GESTATION OF PREGNANCY (CMD): ICD-10-CM

## 2024-08-29 DIAGNOSIS — Z3A.25 25 WEEKS GESTATION OF PREGNANCY (CMD): Primary | ICD-10-CM

## 2024-08-29 DIAGNOSIS — Z36.9 ENCOUNTER FOR ANTENATAL SCREENING OF MOTHER (CMD): ICD-10-CM

## 2024-08-30 LAB
BASOPHILS # BLD: 0 K/MCL (ref 0–0.3)
BASOPHILS NFR BLD: 0 %
DEPRECATED RDW RBC: 48.3 FL (ref 39–50)
EOSINOPHIL # BLD: 0.1 K/MCL (ref 0–0.5)
EOSINOPHIL NFR BLD: 1 %
ERYTHROCYTE [DISTWIDTH] IN BLOOD: 13.9 % (ref 11–15)
GLUCOSE 1H P 50 G GLC PO SERPL-MCNC: 123 MG/DL (ref 65–139)
HCT VFR BLD CALC: 34.7 % (ref 36–46.5)
HGB BLD-MCNC: 11.3 G/DL (ref 12–15.5)
IMM GRANULOCYTES # BLD AUTO: 0.1 K/MCL (ref 0–0.2)
IMM GRANULOCYTES # BLD: 1 %
LYMPHOCYTES # BLD: 1.8 K/MCL (ref 1–4.8)
LYMPHOCYTES NFR BLD: 20 %
MCH RBC QN AUTO: 31 PG (ref 26–34)
MCHC RBC AUTO-ENTMCNC: 32.6 G/DL (ref 32–36.5)
MCV RBC AUTO: 95.3 FL (ref 78–100)
MONOCYTES # BLD: 0.5 K/MCL (ref 0.3–0.9)
MONOCYTES NFR BLD: 6 %
NEUTROPHILS # BLD: 6.7 K/MCL (ref 1.8–7.7)
NEUTROPHILS NFR BLD: 72 %
NRBC BLD MANUAL-RTO: 0 /100 WBC
PLATELET # BLD AUTO: 221 K/MCL (ref 140–450)
RBC # BLD: 3.64 MIL/MCL (ref 4–5.2)
WBC # BLD: 9.2 K/MCL (ref 4.2–11)

## 2024-09-10 ENCOUNTER — TELEPHONE (OUTPATIENT)
Dept: OBGYN | Age: 29
End: 2024-09-10

## 2024-09-10 ENCOUNTER — E-ADVICE (OUTPATIENT)
Dept: OBGYN | Age: 29
End: 2024-09-10

## 2024-09-24 ENCOUNTER — APPOINTMENT (OUTPATIENT)
Dept: OBGYN | Age: 29
End: 2024-09-24

## 2024-09-24 VITALS
DIASTOLIC BLOOD PRESSURE: 60 MMHG | SYSTOLIC BLOOD PRESSURE: 110 MMHG | BODY MASS INDEX: 25.97 KG/M2 | HEIGHT: 64 IN | WEIGHT: 152.12 LBS

## 2024-09-24 DIAGNOSIS — Z3A.25 25 WEEKS GESTATION OF PREGNANCY (CMD): Primary | ICD-10-CM

## 2024-09-24 PROBLEM — Z34.90 ENCOUNTER FOR INDUCTION OF LABOR (CMD): Status: RESOLVED | Noted: 2022-12-01 | Resolved: 2024-09-24

## 2024-09-24 PROBLEM — Z98.891 H/O: CESAREAN SECTION: Status: ACTIVE | Noted: 2024-09-24

## 2024-10-01 ENCOUNTER — E-ADVICE (OUTPATIENT)
Dept: OBGYN | Age: 29
End: 2024-10-01

## 2024-10-08 ENCOUNTER — OB CHECK (OUTPATIENT)
Dept: OBGYN | Age: 29
End: 2024-10-08

## 2024-10-08 ENCOUNTER — APPOINTMENT (OUTPATIENT)
Dept: OBGYN | Age: 29
End: 2024-10-08

## 2024-10-08 VITALS
SYSTOLIC BLOOD PRESSURE: 95 MMHG | OXYGEN SATURATION: 98 % | WEIGHT: 156 LBS | DIASTOLIC BLOOD PRESSURE: 64 MMHG | HEART RATE: 95 BPM | HEIGHT: 64 IN | BODY MASS INDEX: 26.63 KG/M2

## 2024-10-08 DIAGNOSIS — Z3A.30 30 WEEKS GESTATION OF PREGNANCY (CMD): Primary | ICD-10-CM

## 2024-10-08 ASSESSMENT — PAIN SCALES - GENERAL: PAINLEVEL: 0

## 2024-10-22 ENCOUNTER — APPOINTMENT (OUTPATIENT)
Dept: OBGYN | Age: 29
End: 2024-10-22

## 2024-10-22 VITALS
OXYGEN SATURATION: 99 % | BODY MASS INDEX: 27.21 KG/M2 | WEIGHT: 159.4 LBS | HEART RATE: 88 BPM | DIASTOLIC BLOOD PRESSURE: 59 MMHG | HEIGHT: 64 IN | SYSTOLIC BLOOD PRESSURE: 97 MMHG

## 2024-10-22 DIAGNOSIS — Z98.891 H/O: CESAREAN SECTION: ICD-10-CM

## 2024-10-22 DIAGNOSIS — Z3A.32 32 WEEKS GESTATION OF PREGNANCY (CMD): Primary | ICD-10-CM

## 2024-10-23 LAB
BASOPHILS # BLD: 0 K/MCL (ref 0–0.3)
BASOPHILS NFR BLD: 0 %
DEPRECATED RDW RBC: 41.7 FL (ref 39–50)
EOSINOPHIL # BLD: 0 K/MCL (ref 0–0.5)
EOSINOPHIL NFR BLD: 0 %
ERYTHROCYTE [DISTWIDTH] IN BLOOD: 12.6 % (ref 11–15)
HCT VFR BLD CALC: 32.8 % (ref 36–46.5)
HGB BLD-MCNC: 10.9 G/DL (ref 12–15.5)
HIV 1+2 AB+HIV1 P24 AG SERPL QL IA: NONREACTIVE
IMM GRANULOCYTES # BLD AUTO: 0.1 K/MCL (ref 0–0.2)
IMM GRANULOCYTES # BLD: 1 %
LYMPHOCYTES # BLD: 1.7 K/MCL (ref 1–4.8)
LYMPHOCYTES NFR BLD: 22 %
MCH RBC QN AUTO: 30.4 PG (ref 26–34)
MCHC RBC AUTO-ENTMCNC: 33.2 G/DL (ref 32–36.5)
MCV RBC AUTO: 91.6 FL (ref 78–100)
MONOCYTES # BLD: 0.6 K/MCL (ref 0.3–0.9)
MONOCYTES NFR BLD: 8 %
NEUTROPHILS # BLD: 5.1 K/MCL (ref 1.8–7.7)
NEUTROPHILS NFR BLD: 69 %
NRBC BLD MANUAL-RTO: 0 /100 WBC
PLATELET # BLD AUTO: 197 K/MCL (ref 140–450)
RBC # BLD: 3.58 MIL/MCL (ref 4–5.2)
RPR SER QL: NONREACTIVE
WBC # BLD: 7.5 K/MCL (ref 4.2–11)

## 2024-11-05 ENCOUNTER — APPOINTMENT (OUTPATIENT)
Dept: OBGYN | Age: 29
End: 2024-11-05

## 2024-11-05 VITALS
SYSTOLIC BLOOD PRESSURE: 103 MMHG | BODY MASS INDEX: 27.78 KG/M2 | OXYGEN SATURATION: 98 % | HEART RATE: 82 BPM | WEIGHT: 162.7 LBS | HEIGHT: 64 IN | DIASTOLIC BLOOD PRESSURE: 65 MMHG

## 2024-11-05 DIAGNOSIS — Z98.891 H/O: CESAREAN SECTION: ICD-10-CM

## 2024-11-05 DIAGNOSIS — Z3A.34 34 WEEKS GESTATION OF PREGNANCY (CMD): Primary | ICD-10-CM

## 2024-11-05 ASSESSMENT — PAIN SCALES - GENERAL: PAINLEVEL: 0

## 2024-11-19 ENCOUNTER — APPOINTMENT (OUTPATIENT)
Dept: OBGYN | Age: 29
End: 2024-11-19

## 2024-11-22 ENCOUNTER — APPOINTMENT (OUTPATIENT)
Dept: OBGYN | Age: 29
End: 2024-11-22

## 2024-11-22 VITALS
HEIGHT: 64 IN | DIASTOLIC BLOOD PRESSURE: 64 MMHG | BODY MASS INDEX: 28.51 KG/M2 | WEIGHT: 167 LBS | HEART RATE: 83 BPM | SYSTOLIC BLOOD PRESSURE: 109 MMHG

## 2024-11-22 DIAGNOSIS — Z3A.34 34 WEEKS GESTATION OF PREGNANCY (CMD): ICD-10-CM

## 2024-11-22 DIAGNOSIS — Z3A.37 37 WEEKS GESTATION OF PREGNANCY (CMD): Primary | ICD-10-CM

## 2024-11-25 LAB — GP B STREP SPEC QL CULT: NORMAL

## 2024-11-26 ENCOUNTER — APPOINTMENT (OUTPATIENT)
Dept: OBGYN | Age: 29
End: 2024-11-26

## 2024-11-26 ENCOUNTER — OB CHECK (OUTPATIENT)
Dept: OBGYN | Age: 29
End: 2024-11-26

## 2024-11-26 VITALS
DIASTOLIC BLOOD PRESSURE: 65 MMHG | BODY MASS INDEX: 28.51 KG/M2 | HEIGHT: 64 IN | SYSTOLIC BLOOD PRESSURE: 118 MMHG | HEART RATE: 92 BPM | OXYGEN SATURATION: 97 % | WEIGHT: 167 LBS

## 2024-11-26 DIAGNOSIS — Z98.891 H/O: CESAREAN SECTION: Primary | ICD-10-CM

## 2024-11-26 DIAGNOSIS — Z3A.37 37 WEEKS GESTATION OF PREGNANCY (CMD): ICD-10-CM

## 2024-11-26 PROCEDURE — 99213 OFFICE O/P EST LOW 20 MIN: CPT | Performed by: STUDENT IN AN ORGANIZED HEALTH CARE EDUCATION/TRAINING PROGRAM

## 2024-11-26 ASSESSMENT — PAIN SCALES - GENERAL: PAINLEVEL: 0

## 2024-12-03 ENCOUNTER — APPOINTMENT (OUTPATIENT)
Dept: OBGYN | Age: 29
End: 2024-12-03

## 2024-12-04 ENCOUNTER — OB CHECK (OUTPATIENT)
Dept: OBGYN | Age: 29
End: 2024-12-04

## 2024-12-04 VITALS
HEART RATE: 93 BPM | SYSTOLIC BLOOD PRESSURE: 108 MMHG | DIASTOLIC BLOOD PRESSURE: 60 MMHG | BODY MASS INDEX: 28.68 KG/M2 | WEIGHT: 168 LBS | HEIGHT: 64 IN

## 2024-12-04 DIAGNOSIS — Z3A.38 38 WEEKS GESTATION OF PREGNANCY (CMD): Primary | ICD-10-CM

## 2024-12-04 DIAGNOSIS — Z98.891 HISTORY OF C-SECTION: ICD-10-CM

## 2024-12-04 DIAGNOSIS — O34.219 DESIRES VBAC (VAGINAL BIRTH AFTER CESAREAN) TRIAL (CMD): ICD-10-CM

## 2024-12-04 PROCEDURE — 99214 OFFICE O/P EST MOD 30 MIN: CPT

## 2024-12-04 ASSESSMENT — PAIN SCALES - GENERAL: PAINLEVEL: 0

## 2024-12-10 ENCOUNTER — APPOINTMENT (OUTPATIENT)
Dept: OBGYN | Age: 29
End: 2024-12-10

## 2024-12-10 VITALS
BODY MASS INDEX: 29.12 KG/M2 | WEIGHT: 170.6 LBS | SYSTOLIC BLOOD PRESSURE: 119 MMHG | HEIGHT: 64 IN | OXYGEN SATURATION: 98 % | DIASTOLIC BLOOD PRESSURE: 76 MMHG | HEART RATE: 92 BPM

## 2024-12-10 DIAGNOSIS — Z98.891 H/O: CESAREAN SECTION: Primary | ICD-10-CM

## 2024-12-10 PROCEDURE — 99213 OFFICE O/P EST LOW 20 MIN: CPT | Performed by: STUDENT IN AN ORGANIZED HEALTH CARE EDUCATION/TRAINING PROGRAM

## 2024-12-10 ASSESSMENT — PAIN SCALES - GENERAL: PAINLEVEL: 0

## 2024-12-11 ENCOUNTER — LAB SERVICES (OUTPATIENT)
Dept: LAB | Age: 29
End: 2024-12-11

## 2024-12-11 DIAGNOSIS — Z01.812 PRE-PROCEDURE LAB EXAM: Primary | ICD-10-CM

## 2024-12-11 LAB
ABO + RH BLD: NORMAL
BASOPHILS # BLD: 0 K/MCL (ref 0–0.3)
BASOPHILS NFR BLD: 0 %
BLD GP AB SCN SERPL QL GEL: NEGATIVE
DEPRECATED RDW RBC: 45.9 FL (ref 39–50)
EOSINOPHIL # BLD: 0 K/MCL (ref 0–0.5)
EOSINOPHIL NFR BLD: 1 %
ERYTHROCYTE [DISTWIDTH] IN BLOOD: 13.8 % (ref 11–15)
HCT VFR BLD CALC: 35.5 % (ref 36–46.5)
HGB BLD-MCNC: 11.7 G/DL (ref 12–15.5)
IMM GRANULOCYTES # BLD AUTO: 0 K/MCL (ref 0–0.2)
IMM GRANULOCYTES # BLD: 1 %
LYMPHOCYTES # BLD: 2 K/MCL (ref 1–4.8)
LYMPHOCYTES NFR BLD: 27 %
MCH RBC QN AUTO: 30.2 PG (ref 26–34)
MCHC RBC AUTO-ENTMCNC: 33 G/DL (ref 32–36.5)
MCV RBC AUTO: 91.5 FL (ref 78–100)
MONOCYTES # BLD: 0.4 K/MCL (ref 0.3–0.9)
MONOCYTES NFR BLD: 6 %
NEUTROPHILS # BLD: 5 K/MCL (ref 1.8–7.7)
NEUTROPHILS NFR BLD: 65 %
NRBC BLD MANUAL-RTO: 0 /100 WBC
PLATELET # BLD AUTO: 160 K/MCL (ref 140–450)
RAINBOW EXTRA TUBES HOLD SPECIMEN: NORMAL
RBC # BLD: 3.88 MIL/MCL (ref 4–5.2)
TYPE AND SCREEN EXPIRATION DATE: NORMAL
WBC # BLD: 7.5 K/MCL (ref 4.2–11)

## 2024-12-11 PROCEDURE — 36415 COLL VENOUS BLD VENIPUNCTURE: CPT | Performed by: OBSTETRICS & GYNECOLOGY

## 2024-12-11 PROCEDURE — 86901 BLOOD TYPING SEROLOGIC RH(D): CPT | Performed by: INTERNAL MEDICINE

## 2024-12-11 PROCEDURE — 86900 BLOOD TYPING SEROLOGIC ABO: CPT | Performed by: INTERNAL MEDICINE

## 2024-12-11 PROCEDURE — 86850 RBC ANTIBODY SCREEN: CPT | Performed by: INTERNAL MEDICINE

## 2024-12-11 PROCEDURE — 85025 COMPLETE CBC W/AUTO DIFF WBC: CPT | Performed by: INTERNAL MEDICINE

## 2024-12-12 ENCOUNTER — ANESTHESIA EVENT (OUTPATIENT)
Dept: OBGYN | Age: 29
End: 2024-12-12

## 2024-12-13 ENCOUNTER — HOSPITAL ENCOUNTER (INPATIENT)
Age: 29
DRG: 540 | End: 2024-12-13
Attending: OBSTETRICS & GYNECOLOGY | Admitting: OBSTETRICS & GYNECOLOGY

## 2024-12-13 ENCOUNTER — ANESTHESIA (OUTPATIENT)
Dept: OBGYN | Age: 29
End: 2024-12-13

## 2024-12-13 DIAGNOSIS — G89.18 POST-OP PAIN: Primary | ICD-10-CM

## 2024-12-13 DIAGNOSIS — Z34.93 PREGNANT AND NOT YET DELIVERED IN THIRD TRIMESTER (CMD): ICD-10-CM

## 2024-12-13 DIAGNOSIS — Z98.891 H/O: CESAREAN SECTION: ICD-10-CM

## 2024-12-13 PROCEDURE — 10006023 HB SUPPLY 272: Performed by: OBSTETRICS & GYNECOLOGY

## 2024-12-13 PROCEDURE — 10002801 HB RX 250 W/O HCPCS: Performed by: OBSTETRICS & GYNECOLOGY

## 2024-12-13 PROCEDURE — 10002800 HB RX 250 W HCPCS: Performed by: ANESTHESIOLOGY

## 2024-12-13 PROCEDURE — 86592 SYPHILIS TEST NON-TREP QUAL: CPT

## 2024-12-13 PROCEDURE — 10002801 HB RX 250 W/O HCPCS: Performed by: NURSE ANESTHETIST, CERTIFIED REGISTERED

## 2024-12-13 PROCEDURE — 10004651 HB RX, NO CHARGE ITEM

## 2024-12-13 PROCEDURE — 10002807 HB RX 258

## 2024-12-13 PROCEDURE — 10002807 HB RX 258: Performed by: NURSE ANESTHETIST, CERTIFIED REGISTERED

## 2024-12-13 PROCEDURE — 10002801 HB RX 250 W/O HCPCS: Performed by: ANESTHESIOLOGY

## 2024-12-13 PROCEDURE — 13000001 HB PHASE II RECOVERY EA 30 MINUTES: Performed by: OBSTETRICS & GYNECOLOGY

## 2024-12-13 PROCEDURE — 10002800 HB RX 250 W HCPCS

## 2024-12-13 PROCEDURE — 13000012 HB ANESTHESIA SPINAL/EPIDURAL IN L&D: Performed by: OBSTETRICS & GYNECOLOGY

## 2024-12-13 PROCEDURE — 10000003 HB ROOM CHARGE WOMEN'S HEALTH

## 2024-12-13 PROCEDURE — 13000140 HB OB COMPLEX  CASE S/U + 1ST 15 MIN: Performed by: OBSTETRICS & GYNECOLOGY

## 2024-12-13 PROCEDURE — 10002800 HB RX 250 W HCPCS: Performed by: NURSE ANESTHETIST, CERTIFIED REGISTERED

## 2024-12-13 PROCEDURE — 13001456 HB PERINATAL CARE

## 2024-12-13 PROCEDURE — 13000141 HB OB COMPLEX CASE EACH ADD MINUTE: Performed by: OBSTETRICS & GYNECOLOGY

## 2024-12-13 PROCEDURE — 59514 CESAREAN DELIVERY ONLY: CPT

## 2024-12-13 RX ORDER — OXYTOCIN 10 [USP'U]/ML
INJECTION, SOLUTION INTRAMUSCULAR; INTRAVENOUS PRN
Status: DISCONTINUED | OUTPATIENT
Start: 2024-12-13 | End: 2024-12-13

## 2024-12-13 RX ORDER — SODIUM CHLORIDE 9 MG/ML
INJECTION, SOLUTION INTRAVENOUS CONTINUOUS PRN
Status: DISCONTINUED | OUTPATIENT
Start: 2024-12-13 | End: 2024-12-13

## 2024-12-13 RX ORDER — 0.9 % SODIUM CHLORIDE 0.9 %
2 VIAL (ML) INJECTION EVERY 12 HOURS SCHEDULED
Status: DISCONTINUED | OUTPATIENT
Start: 2024-12-13 | End: 2024-12-15 | Stop reason: HOSPADM

## 2024-12-13 RX ORDER — IBUPROFEN 600 MG/1
600 TABLET, FILM COATED ORAL EVERY 6 HOURS
Status: DISCONTINUED | OUTPATIENT
Start: 2024-12-14 | End: 2024-12-15 | Stop reason: HOSPADM

## 2024-12-13 RX ORDER — OXYCODONE HYDROCHLORIDE 5 MG/1
5 TABLET ORAL EVERY 4 HOURS PRN
Status: DISCONTINUED | OUTPATIENT
Start: 2024-12-13 | End: 2024-12-13

## 2024-12-13 RX ORDER — CEFAZOLIN SODIUM 1 G/3ML
INJECTION, POWDER, FOR SOLUTION INTRAMUSCULAR; INTRAVENOUS PRN
Status: DISCONTINUED | OUTPATIENT
Start: 2024-12-13 | End: 2024-12-13

## 2024-12-13 RX ORDER — OXYTOCIN-SODIUM CHLORIDE 0.9% IV SOLN 30 UNIT/500ML 30-0.9/5 UT/ML-%
0-334 SOLUTION INTRAVENOUS CONTINUOUS
Status: DISCONTINUED | OUTPATIENT
Start: 2024-12-13 | End: 2024-12-15 | Stop reason: HOSPADM

## 2024-12-13 RX ORDER — 0.9 % SODIUM CHLORIDE 0.9 %
10 VIAL (ML) INJECTION PRN
Status: DISCONTINUED | OUTPATIENT
Start: 2024-12-13 | End: 2024-12-15 | Stop reason: HOSPADM

## 2024-12-13 RX ORDER — AMOXICILLIN 250 MG
2 CAPSULE ORAL 2 TIMES DAILY
Status: DISCONTINUED | OUTPATIENT
Start: 2024-12-13 | End: 2024-12-15 | Stop reason: HOSPADM

## 2024-12-13 RX ORDER — ACETAMINOPHEN 500 MG
1000 TABLET ORAL EVERY 8 HOURS
Status: DISCONTINUED | OUTPATIENT
Start: 2024-12-13 | End: 2024-12-15 | Stop reason: HOSPADM

## 2024-12-13 RX ORDER — SODIUM CHLORIDE, SODIUM LACTATE, POTASSIUM CHLORIDE, CALCIUM CHLORIDE 600; 310; 30; 20 MG/100ML; MG/100ML; MG/100ML; MG/100ML
INJECTION, SOLUTION INTRAVENOUS CONTINUOUS
Status: DISCONTINUED | OUTPATIENT
Start: 2024-12-13 | End: 2024-12-13

## 2024-12-13 RX ORDER — 0.9 % SODIUM CHLORIDE 0.9 %
10 VIAL (ML) INJECTION PRN
Status: DISCONTINUED | OUTPATIENT
Start: 2024-12-13 | End: 2024-12-13

## 2024-12-13 RX ORDER — BISACODYL 10 MG
10 SUPPOSITORY, RECTAL RECTAL DAILY PRN
Status: DISCONTINUED | OUTPATIENT
Start: 2024-12-13 | End: 2024-12-15 | Stop reason: HOSPADM

## 2024-12-13 RX ORDER — CALCIUM CARBONATE 500 MG/1
500 TABLET, CHEWABLE ORAL EVERY 4 HOURS PRN
Status: DISCONTINUED | OUTPATIENT
Start: 2024-12-13 | End: 2024-12-15 | Stop reason: HOSPADM

## 2024-12-13 RX ORDER — ONDANSETRON 2 MG/ML
INJECTION INTRAMUSCULAR; INTRAVENOUS PRN
Status: DISCONTINUED | OUTPATIENT
Start: 2024-12-13 | End: 2024-12-13

## 2024-12-13 RX ORDER — DEXAMETHASONE SODIUM PHOSPHATE 4 MG/ML
INJECTION, SOLUTION INTRA-ARTICULAR; INTRALESIONAL; INTRAMUSCULAR; INTRAVENOUS; SOFT TISSUE PRN
Status: DISCONTINUED | OUTPATIENT
Start: 2024-12-13 | End: 2024-12-13

## 2024-12-13 RX ORDER — MORPHINE SULFATE 1 MG/ML
INJECTION, SOLUTION EPIDURAL; INTRATHECAL; INTRAVENOUS
Status: COMPLETED | OUTPATIENT
Start: 2024-12-13 | End: 2024-12-13

## 2024-12-13 RX ORDER — 0.9 % SODIUM CHLORIDE 0.9 %
2 VIAL (ML) INJECTION EVERY 12 HOURS SCHEDULED
Status: DISCONTINUED | OUTPATIENT
Start: 2024-12-13 | End: 2024-12-13

## 2024-12-13 RX ORDER — BUPIVACAINE HYDROCHLORIDE 7.5 MG/ML
INJECTION, SOLUTION INTRASPINAL
Status: COMPLETED | OUTPATIENT
Start: 2024-12-13 | End: 2024-12-13

## 2024-12-13 RX ORDER — PROCHLORPERAZINE MALEATE 5 MG/1
5 TABLET ORAL EVERY 4 HOURS PRN
Status: DISCONTINUED | OUTPATIENT
Start: 2024-12-13 | End: 2024-12-15 | Stop reason: HOSPADM

## 2024-12-13 RX ORDER — HYDROCORTISONE ACETATE PRAMOXINE HCL 2.5; 1 G/100G; G/100G
1 CREAM TOPICAL 3 TIMES DAILY PRN
Status: DISCONTINUED | OUTPATIENT
Start: 2024-12-13 | End: 2024-12-15 | Stop reason: HOSPADM

## 2024-12-13 RX ORDER — NALOXONE HCL 0.4 MG/ML
0.1 VIAL (ML) INJECTION PRN
Status: ACTIVE | OUTPATIENT
Start: 2024-12-13 | End: 2024-12-14

## 2024-12-13 RX ORDER — PROCHLORPERAZINE EDISYLATE 5 MG/ML
5 INJECTION INTRAMUSCULAR; INTRAVENOUS EVERY 4 HOURS PRN
Status: DISCONTINUED | OUTPATIENT
Start: 2024-12-13 | End: 2024-12-15 | Stop reason: HOSPADM

## 2024-12-13 RX ORDER — MAGNESIUM HYDROXIDE 1200 MG/15ML
LIQUID ORAL PRN
Status: DISCONTINUED | OUTPATIENT
Start: 2024-12-13 | End: 2024-12-13 | Stop reason: HOSPADM

## 2024-12-13 RX ORDER — OXYTOCIN-SODIUM CHLORIDE 0.9% IV SOLN 30 UNIT/500ML 30-0.9/5 UT/ML-%
0-334 SOLUTION INTRAVENOUS CONTINUOUS
Status: DISCONTINUED | OUTPATIENT
Start: 2024-12-13 | End: 2024-12-13

## 2024-12-13 RX ORDER — SIMETHICONE 125 MG
125 TABLET,CHEWABLE ORAL 4 TIMES DAILY PRN
Status: DISCONTINUED | OUTPATIENT
Start: 2024-12-13 | End: 2024-12-15 | Stop reason: HOSPADM

## 2024-12-13 RX ORDER — KETOROLAC TROMETHAMINE 15 MG/ML
15 INJECTION, SOLUTION INTRAMUSCULAR; INTRAVENOUS EVERY 6 HOURS
Status: COMPLETED | OUTPATIENT
Start: 2024-12-13 | End: 2024-12-14

## 2024-12-13 RX ORDER — OXYCODONE HYDROCHLORIDE 5 MG/1
5 TABLET ORAL EVERY 4 HOURS PRN
Status: DISCONTINUED | OUTPATIENT
Start: 2024-12-14 | End: 2024-12-15 | Stop reason: HOSPADM

## 2024-12-13 RX ORDER — ONDANSETRON 2 MG/ML
4 INJECTION INTRAMUSCULAR; INTRAVENOUS 2 TIMES DAILY PRN
Status: DISPENSED | OUTPATIENT
Start: 2024-12-13 | End: 2024-12-15

## 2024-12-13 RX ORDER — POLYETHYLENE GLYCOL 3350 17 G/17G
17 POWDER, FOR SOLUTION ORAL DAILY PRN
Status: DISCONTINUED | OUTPATIENT
Start: 2024-12-13 | End: 2024-12-15 | Stop reason: HOSPADM

## 2024-12-13 RX ORDER — SODIUM CHLORIDE, SODIUM LACTATE, POTASSIUM CHLORIDE, CALCIUM CHLORIDE 600; 310; 30; 20 MG/100ML; MG/100ML; MG/100ML; MG/100ML
INJECTION, SOLUTION INTRAVENOUS CONTINUOUS
Status: DISCONTINUED | OUTPATIENT
Start: 2024-12-13 | End: 2024-12-15 | Stop reason: HOSPADM

## 2024-12-13 RX ORDER — ACETAMINOPHEN 500 MG
1000 TABLET ORAL
Status: COMPLETED | OUTPATIENT
Start: 2024-12-13 | End: 2024-12-13

## 2024-12-13 RX ADMIN — ACETAMINOPHEN 1000 MG: 500 TABLET ORAL at 10:52

## 2024-12-13 RX ADMIN — SODIUM CHLORIDE: 9 INJECTION, SOLUTION INTRAVENOUS at 11:44

## 2024-12-13 RX ADMIN — SODIUM CHLORIDE, POTASSIUM CHLORIDE, SODIUM LACTATE AND CALCIUM CHLORIDE 100 ML/HR: 600; 310; 30; 20 INJECTION, SOLUTION INTRAVENOUS at 13:08

## 2024-12-13 RX ADMIN — OXYTOCIN 10 UNITS: 10 INJECTION, SOLUTION INTRAMUSCULAR; INTRAVENOUS at 12:15

## 2024-12-13 RX ADMIN — OXYTOCIN 10 UNITS: 10 INJECTION, SOLUTION INTRAMUSCULAR; INTRAVENOUS at 12:09

## 2024-12-13 RX ADMIN — SODIUM CHLORIDE: 9 INJECTION, SOLUTION INTRAVENOUS at 11:00

## 2024-12-13 RX ADMIN — ONDANSETRON 4 MG: 2 INJECTION INTRAMUSCULAR; INTRAVENOUS at 12:08

## 2024-12-13 RX ADMIN — SODIUM CHLORIDE: 9 INJECTION, SOLUTION INTRAVENOUS at 12:54

## 2024-12-13 RX ADMIN — SODIUM CHLORIDE, POTASSIUM CHLORIDE, SODIUM LACTATE AND CALCIUM CHLORIDE 100 ML/HR: 600; 310; 30; 20 INJECTION, SOLUTION INTRAVENOUS at 10:07

## 2024-12-13 RX ADMIN — KETOROLAC TROMETHAMINE 15 MG: 15 INJECTION, SOLUTION INTRAMUSCULAR; INTRAVENOUS at 17:50

## 2024-12-13 RX ADMIN — KETOROLAC TROMETHAMINE 15 MG: 30 INJECTION, SOLUTION INTRAMUSCULAR at 12:49

## 2024-12-13 RX ADMIN — DEXAMETHASONE SODIUM PHOSPHATE 4 MG: 4 INJECTION INTRA-ARTICULAR; INTRALESIONAL; INTRAMUSCULAR; INTRAVENOUS; SOFT TISSUE at 12:08

## 2024-12-13 RX ADMIN — BUPIVACAINE HYDROCHLORIDE IN DEXTROSE 1.6 ML: 7.5 INJECTION, SOLUTION SUBARACHNOID at 11:35

## 2024-12-13 RX ADMIN — ONDANSETRON 4 MG: 2 INJECTION, SOLUTION INTRAMUSCULAR; INTRAVENOUS at 17:48

## 2024-12-13 RX ADMIN — ACETAMINOPHEN 1000 MG: 500 TABLET ORAL at 22:05

## 2024-12-13 RX ADMIN — Medication 100 MCG: at 11:41

## 2024-12-13 RX ADMIN — CEFAZOLIN 2 G: 2 INJECTION, POWDER, FOR SOLUTION INTRAMUSCULAR; INTRAVENOUS at 11:50

## 2024-12-13 RX ADMIN — PHENYLEPHRINE HYDROCHLORIDE 40 MCG/MIN: 10 INJECTION INTRAVENOUS at 11:42

## 2024-12-13 RX ADMIN — KETOROLAC TROMETHAMINE 15 MG: 15 INJECTION, SOLUTION INTRAMUSCULAR; INTRAVENOUS at 23:53

## 2024-12-13 RX ADMIN — FENTANYL CITRATE 20 MCG: 50 INJECTION INTRAMUSCULAR; INTRAVENOUS at 11:35

## 2024-12-13 RX ADMIN — MORPHINE SULFATE 0.15 MG: 1 INJECTION, SOLUTION EPIDURAL; INTRATHECAL; INTRAVENOUS at 11:35

## 2024-12-13 SDOH — ECONOMIC STABILITY: HOUSING INSECURITY: DO YOU HAVE PROBLEMS WITH ANY OF THE FOLLOWING?: NONE OF THE ABOVE

## 2024-12-13 SDOH — SOCIAL STABILITY: SOCIAL INSECURITY: HOW OFTEN DOES ANYONE, INCLUDING FAMILY AND FRIENDS, PHYSICALLY HURT YOU?: NEVER

## 2024-12-13 SDOH — SOCIAL STABILITY: SOCIAL NETWORK: SUPPORT SYSTEMS: CHILDREN;SPOUSE;PARENT

## 2024-12-13 SDOH — SOCIAL STABILITY: SOCIAL INSECURITY: HOW OFTEN DOES ANYONE, INCLUDING FAMILY AND FRIENDS, SCREAM OR CURSE AT YOU?: NEVER

## 2024-12-13 SDOH — ECONOMIC STABILITY: HOUSING INSECURITY: WHAT IS YOUR LIVING SITUATION TODAY?: I HAVE A STEADY PLACE TO LIVE

## 2024-12-13 SDOH — ECONOMIC STABILITY: GENERAL

## 2024-12-13 SDOH — HEALTH STABILITY: GENERAL
BECAUSE OF A PHYSICAL, MENTAL, OR EMOTIONAL CONDITION, DO YOU HAVE SERIOUS DIFFICULTY CONCENTRATING, REMEMBERING OR MAKING DECISIONS?: NO

## 2024-12-13 SDOH — HEALTH STABILITY: GENERAL: BECAUSE OF A PHYSICAL, MENTAL, OR EMOTIONAL CONDITION, DO YOU HAVE DIFFICULTY DOING ERRANDS ALONE?: NO

## 2024-12-13 SDOH — ECONOMIC STABILITY: INCOME INSECURITY: IN THE PAST 12 MONTHS, HAS THE ELECTRIC, GAS, OIL, OR WATER COMPANY THREATENED TO SHUT OFF SERVICE IN YOUR HOME?: NO

## 2024-12-13 SDOH — SOCIAL STABILITY: SOCIAL INSECURITY: HOW OFTEN DOES ANYONE, INCLUDING FAMILY AND FRIENDS, THREATEN YOU WITH HARM?: NEVER

## 2024-12-13 SDOH — SOCIAL STABILITY: SOCIAL INSECURITY: HOW OFTEN DOES ANYONE, INCLUDING FAMILY AND FRIENDS, INSULT OR TALK DOWN TO YOU?: NEVER

## 2024-12-13 SDOH — ECONOMIC STABILITY: HOUSING INSECURITY: WHAT IS YOUR LIVING SITUATION TODAY?: HOUSE

## 2024-12-13 SDOH — ECONOMIC STABILITY: FOOD INSECURITY: WITHIN THE PAST 12 MONTHS, THE FOOD YOU BOUGHT JUST DIDN'T LAST AND YOU DIDN'T HAVE MONEY TO GET MORE.: NEVER TRUE

## 2024-12-13 SDOH — ECONOMIC STABILITY: GENERAL: WOULD YOU LIKE HELP WITH ANY OF THE FOLLOWING NEEDS?: I DON'T WANT HELP WITH ANY OF THESE

## 2024-12-13 SDOH — HEALTH STABILITY: PHYSICAL HEALTH: DO YOU HAVE SERIOUS DIFFICULTY WALKING OR CLIMBING STAIRS?: NO

## 2024-12-13 SDOH — ECONOMIC STABILITY: HOUSING INSECURITY: WHAT IS YOUR LIVING SITUATION TODAY?: CHILDREN;SIGNIFICANT OTHER

## 2024-12-13 SDOH — HEALTH STABILITY: PHYSICAL HEALTH: DO YOU HAVE DIFFICULTY DRESSING OR BATHING?: NO

## 2024-12-13 ASSESSMENT — PAIN SCALES - GENERAL
PAINLEVEL_OUTOF10: 0
PAINLEVEL_OUTOF10: 1
PAINLEVEL_OUTOF10: 2
PAINLEVEL_OUTOF10: 1
PAINLEVEL_OUTOF10: 0
PAINLEVEL_OUTOF10: 3
PAINLEVEL_OUTOF10: 2
PAINLEVEL_OUTOF10: 0

## 2024-12-13 ASSESSMENT — ACTIVITIES OF DAILY LIVING (ADL)
ADL_BEFORE_ADMISSION: INDEPENDENT
ADL_SCORE: 12
RECENT_DECLINE_ADL: NO
ADL_SHORT_OF_BREATH: NO

## 2024-12-13 ASSESSMENT — ENCOUNTER SYMPTOMS: EXERCISE TOLERANCE: GOOD (>4 METS)

## 2024-12-13 ASSESSMENT — PATIENT HEALTH QUESTIONNAIRE - PHQ9
CLINICAL INTERPRETATION OF PHQ2 SCORE: NO FURTHER SCREENING NEEDED
IS PATIENT ABLE TO COMPLETE PHQ2 OR PHQ9: YES
SUM OF ALL RESPONSES TO PHQ9 QUESTIONS 1 AND 2: 0

## 2024-12-13 ASSESSMENT — LIFESTYLE VARIABLES
HOW MANY STANDARD DRINKS CONTAINING ALCOHOL DO YOU HAVE ON A TYPICAL DAY: 0,1 OR 2
ALCOHOL_USE_STATUS: NO OR LOW RISK WITH VALIDATED TOOL
AUDIT-C TOTAL SCORE: 0
HOW OFTEN DO YOU HAVE A DRINK CONTAINING ALCOHOL: NEVER
HOW OFTEN DO YOU HAVE 6 OR MORE DRINKS ON ONE OCCASION: NEVER

## 2024-12-13 ASSESSMENT — POSTPARTUM DEPRESSION SCREENING: INITIAL DEPRESSION SCREENING SCORE: 0

## 2024-12-14 VITALS
OXYGEN SATURATION: 98 % | HEART RATE: 70 BPM | HEIGHT: 64 IN | TEMPERATURE: 98.1 F | RESPIRATION RATE: 16 BRPM | WEIGHT: 168.65 LBS | SYSTOLIC BLOOD PRESSURE: 109 MMHG | BODY MASS INDEX: 28.79 KG/M2 | DIASTOLIC BLOOD PRESSURE: 67 MMHG

## 2024-12-14 LAB
HCT VFR BLD CALC: 28 % (ref 36–46.5)
HGB BLD-MCNC: 9.1 G/DL (ref 12–15.5)
RPR SER QL: NONREACTIVE

## 2024-12-14 PROCEDURE — 10004651 HB RX, NO CHARGE ITEM

## 2024-12-14 PROCEDURE — 10000003 HB ROOM CHARGE WOMEN'S HEALTH

## 2024-12-14 PROCEDURE — 10002800 HB RX 250 W HCPCS

## 2024-12-14 PROCEDURE — 85014 HEMATOCRIT: CPT

## 2024-12-14 PROCEDURE — 99233 SBSQ HOSP IP/OBS HIGH 50: CPT

## 2024-12-14 PROCEDURE — 10002803 HB RX 637

## 2024-12-14 PROCEDURE — 36415 COLL VENOUS BLD VENIPUNCTURE: CPT

## 2024-12-14 RX ADMIN — ACETAMINOPHEN 1000 MG: 500 TABLET ORAL at 21:50

## 2024-12-14 RX ADMIN — ACETAMINOPHEN 1000 MG: 500 TABLET ORAL at 06:13

## 2024-12-14 RX ADMIN — SIMETHICONE 125 MG: 125 TABLET, CHEWABLE ORAL at 20:10

## 2024-12-14 RX ADMIN — SENNOSIDES AND DOCUSATE SODIUM 2 TABLET: 50; 8.6 TABLET ORAL at 21:50

## 2024-12-14 RX ADMIN — IBUPROFEN 600 MG: 600 TABLET, FILM COATED ORAL at 23:45

## 2024-12-14 RX ADMIN — IBUPROFEN 600 MG: 600 TABLET, FILM COATED ORAL at 17:53

## 2024-12-14 RX ADMIN — SENNOSIDES AND DOCUSATE SODIUM 2 TABLET: 50; 8.6 TABLET ORAL at 12:43

## 2024-12-14 RX ADMIN — IBUPROFEN 600 MG: 600 TABLET, FILM COATED ORAL at 12:43

## 2024-12-14 RX ADMIN — ACETAMINOPHEN 1000 MG: 500 TABLET ORAL at 14:19

## 2024-12-14 RX ADMIN — KETOROLAC TROMETHAMINE 15 MG: 15 INJECTION, SOLUTION INTRAMUSCULAR; INTRAVENOUS at 06:14

## 2024-12-14 ASSESSMENT — PAIN SCALES - GENERAL
PAINLEVEL_OUTOF10: 3
PAINLEVEL_OUTOF10: 2
PAINLEVEL_OUTOF10: 4
PAINLEVEL_OUTOF10: 3
PAINLEVEL_OUTOF10: 3
PAINLEVEL_OUTOF10: 2
PAINLEVEL_OUTOF10: 3
PAINLEVEL_OUTOF10: 4
PAINLEVEL_OUTOF10: 1
PAINLEVEL_OUTOF10: 3
PAINLEVEL_OUTOF10: 1

## 2024-12-14 ASSESSMENT — PATIENT HEALTH QUESTIONNAIRE - PHQ9
IS PATIENT ABLE TO COMPLETE PHQ2 OR PHQ9: YES
CLINICAL INTERPRETATION OF PHQ2 SCORE: NO FURTHER SCREENING NEEDED
SUM OF ALL RESPONSES TO PHQ9 QUESTIONS 1 AND 2: 0

## 2024-12-15 VITALS
TEMPERATURE: 98.8 F | HEART RATE: 101 BPM | BODY MASS INDEX: 28.79 KG/M2 | DIASTOLIC BLOOD PRESSURE: 67 MMHG | SYSTOLIC BLOOD PRESSURE: 106 MMHG | WEIGHT: 168.65 LBS | RESPIRATION RATE: 16 BRPM | HEIGHT: 64 IN | OXYGEN SATURATION: 100 %

## 2024-12-15 PROCEDURE — 10002803 HB RX 637

## 2024-12-15 PROCEDURE — 10004651 HB RX, NO CHARGE ITEM

## 2024-12-15 PROCEDURE — 99233 SBSQ HOSP IP/OBS HIGH 50: CPT

## 2024-12-15 RX ORDER — ACETAMINOPHEN 500 MG
1000 TABLET ORAL EVERY 6 HOURS PRN
Qty: 60 TABLET | Refills: 0 | Status: SHIPPED | OUTPATIENT
Start: 2024-12-15

## 2024-12-15 RX ORDER — OXYCODONE HYDROCHLORIDE 5 MG/1
5 TABLET ORAL EVERY 4 HOURS PRN
Qty: 5 TABLET | Refills: 0 | Status: SHIPPED | OUTPATIENT
Start: 2024-12-15

## 2024-12-15 RX ORDER — IBUPROFEN 600 MG/1
600 TABLET, FILM COATED ORAL EVERY 6 HOURS PRN
Qty: 60 TABLET | Refills: 0 | Status: SHIPPED | OUTPATIENT
Start: 2024-12-15

## 2024-12-15 RX ADMIN — IBUPROFEN 600 MG: 600 TABLET, FILM COATED ORAL at 12:46

## 2024-12-15 RX ADMIN — ACETAMINOPHEN 1000 MG: 500 TABLET ORAL at 06:45

## 2024-12-15 RX ADMIN — IBUPROFEN 600 MG: 600 TABLET, FILM COATED ORAL at 18:53

## 2024-12-15 RX ADMIN — ACETAMINOPHEN 1000 MG: 500 TABLET ORAL at 15:23

## 2024-12-15 RX ADMIN — IBUPROFEN 600 MG: 600 TABLET, FILM COATED ORAL at 05:50

## 2024-12-15 RX ADMIN — SENNOSIDES AND DOCUSATE SODIUM 2 TABLET: 50; 8.6 TABLET ORAL at 10:14

## 2024-12-15 ASSESSMENT — PAIN SCALES - GENERAL
PAINLEVEL_OUTOF10: 3
PAINLEVEL_OUTOF10: 2
PAINLEVEL_OUTOF10: 3
PAINLEVEL_OUTOF10: 4
PAINLEVEL_OUTOF10: 2
PAINLEVEL_OUTOF10: 1
PAINLEVEL_OUTOF10: 0
PAINLEVEL_OUTOF10: 2

## 2025-01-23 ENCOUNTER — POSTPARTUM VISIT (OUTPATIENT)
Dept: OBGYN | Age: 30
End: 2025-01-23

## 2025-01-23 VITALS
BODY MASS INDEX: 24.88 KG/M2 | HEIGHT: 64 IN | SYSTOLIC BLOOD PRESSURE: 101 MMHG | DIASTOLIC BLOOD PRESSURE: 62 MMHG | WEIGHT: 145.72 LBS | HEART RATE: 77 BPM

## 2025-06-27 ENCOUNTER — HOSPITAL ENCOUNTER (EMERGENCY)
Age: 30
Discharge: HOME | End: 2025-06-28
Attending: STUDENT IN AN ORGANIZED HEALTH CARE EDUCATION/TRAINING PROGRAM

## 2025-06-27 DIAGNOSIS — M54.6 ACUTE LEFT-SIDED THORACIC BACK PAIN: Primary | ICD-10-CM

## 2025-06-27 LAB — HCG UR QL: NEGATIVE

## 2025-06-27 PROCEDURE — 84703 CHORIONIC GONADOTROPIN ASSAY: CPT

## 2025-06-27 PROCEDURE — 10002803 HB RX 637: Performed by: STUDENT IN AN ORGANIZED HEALTH CARE EDUCATION/TRAINING PROGRAM

## 2025-06-27 PROCEDURE — A9150 MISC/EXPER NON-PRESCRIPT DRU: HCPCS | Performed by: STUDENT IN AN ORGANIZED HEALTH CARE EDUCATION/TRAINING PROGRAM

## 2025-06-27 PROCEDURE — 99285 EMERGENCY DEPT VISIT HI MDM: CPT | Performed by: STUDENT IN AN ORGANIZED HEALTH CARE EDUCATION/TRAINING PROGRAM

## 2025-06-27 RX ORDER — IBUPROFEN 200 MG
400 TABLET ORAL ONCE
Status: COMPLETED | OUTPATIENT
Start: 2025-06-27 | End: 2025-06-27

## 2025-06-27 RX ORDER — ACETAMINOPHEN 325 MG/1
975 TABLET ORAL ONCE
Status: COMPLETED | OUTPATIENT
Start: 2025-06-27 | End: 2025-06-27

## 2025-06-27 RX ADMIN — ACETAMINOPHEN 975 MG: 325 TABLET ORAL at 23:41

## 2025-06-27 RX ADMIN — IBUPROFEN 400 MG: 200 TABLET, FILM COATED ORAL at 23:42

## 2025-06-28 ENCOUNTER — APPOINTMENT (OUTPATIENT)
Dept: GENERAL RADIOLOGY | Age: 30
End: 2025-06-28
Attending: STUDENT IN AN ORGANIZED HEALTH CARE EDUCATION/TRAINING PROGRAM

## 2025-06-28 ENCOUNTER — APPOINTMENT (OUTPATIENT)
Dept: CT IMAGING | Age: 30
End: 2025-06-28
Attending: STUDENT IN AN ORGANIZED HEALTH CARE EDUCATION/TRAINING PROGRAM

## 2025-06-28 VITALS
HEART RATE: 87 BPM | DIASTOLIC BLOOD PRESSURE: 68 MMHG | RESPIRATION RATE: 16 BRPM | OXYGEN SATURATION: 97 % | SYSTOLIC BLOOD PRESSURE: 94 MMHG | TEMPERATURE: 98.4 F

## 2025-06-28 PROCEDURE — 72128 CT CHEST SPINE W/O DYE: CPT

## 2025-06-28 PROCEDURE — 71046 X-RAY EXAM CHEST 2 VIEWS: CPT

## (undated) DEVICE — SUTURE PDS2 0 CTX 60IN MONO LOOP ABS VIOL

## (undated) DEVICE — SUTURE 3-0 CT1 27IN PLN MONO ABS YELLOWISH TAN 842H

## (undated) DEVICE — SUTURE COAT VICRYL 0 CTX L36 IN BRAID COAT VIOL ABS

## (undated) DEVICE — PACK TURN OVER C-SECT LGH

## (undated) DEVICE — SUTURE PDS II 0 CTX L60 IN MONO LOOP VIOL ABS

## (undated) DEVICE — STRIP 4X.5IN STRSTRP POLY REINFORCE SKNCLS WHT STRL LF

## (undated) DEVICE — Device

## (undated) DEVICE — PAD DRSG 8X3IN CRD POLY CTN ABS PERFORATE FILM LF STRL

## (undated) DEVICE — SUTURE 2-0 CT1 27IN PLN MONO ABS YELLOWISH TAN 843H

## (undated) DEVICE — SUTURE MONOCRYL MTPS 4-0 PS2 18IN MONO ABS UNDYED

## (undated) DEVICE — SUTURE VICRYL 0 CTX 36IN BRAID COAT ABS VIOL J370H

## (undated) DEVICE — CATHETER BARDEX LBRCTH 16FR 30CC FOLEY 2 WAY BLN NATURAL RBR

## (undated) DEVICE — SUTURE MONOCRYL 4-0 KS 27IN MONO ABS UNDYED D9600

## (undated) DEVICE — PAD ABD 8X7.5IN CELLULOSE ABS NONWOVEN SEAL EDGE LF STRL

## (undated) NOTE — ED AVS SNAPSHOT
Nancy Lanier   MRN: [id number]    Department:  [facility] Emergency Department   Date of Visit:  3/14/2020           Disclosure     Insurance plans vary and the physician(s) referred by the ER may not be covered by your plan.  Please contac tell this physician (or your personal doctor if your instructions are to return to your personal doctor) about any new or lasting problems. The primary care or specialist physician will see patients referred from the [facility] Emergency Department.  Riya Manuel

## (undated) NOTE — ED AVS SNAPSHOT
Amrita Levy   MRN: [id number]    Department:  [facility] Emergency Department   Date of Visit:  10/15/2019           Disclosure     Insurance plans vary and the physician(s) referred by the ER may not be covered by your plan.  Please contact y CARE PHYSICIAN AT ONCE OR RETURN IMMEDIATELY TO THE EMERGENCY DEPARTMENT. If you have been prescribed any medication(s), please fill your prescription right away and begin taking the medication(s) as directed.   If you believe that any of the medications

## (undated) NOTE — ED AVS SNAPSHOT
Elda Gonsalez   MRN: [id number]    Department:  [facility] Emergency Department   Date of Visit:  8/15/2019           Disclosure     Insurance plans vary and the physician(s) referred by the ER may not be covered by your plan.  Please contact yo CARE PHYSICIAN AT ONCE OR RETURN IMMEDIATELY TO THE EMERGENCY DEPARTMENT. If you have been prescribed any medication(s), please fill your prescription right away and begin taking the medication(s) as directed.   If you believe that any of the medications

## (undated) NOTE — MR AVS SNAPSHOT
After Visit Summary   1/13/2020    Therese Biggs    MRN: [id number]           Visit Information     Date & Time  1/13/2020  3:30 PM Provider  [address]  Phone  [phone] complete it and provide feedback. We strive to deliver the best patient experience and are looking for ways to make improvements. Your feedback will help us do so. For more information on [employer], please visit [email or web address] Life-threatening emergencies needing immediate intervention at a hospital emergency room.     Average cost  $2,300*   *Cost varies based on your insurance coverage  For more information about hours, locations or appointment options available at [facility],   visit

## (undated) NOTE — LETTER
1/13/2020              Leno Tom        [address] [address]         To Whom It May Concern,    Leno Tom has consulted me for a health problems.   She is on medication and should not return to work until [address]